# Patient Record
Sex: MALE | Race: BLACK OR AFRICAN AMERICAN | Employment: OTHER | URBAN - METROPOLITAN AREA
[De-identification: names, ages, dates, MRNs, and addresses within clinical notes are randomized per-mention and may not be internally consistent; named-entity substitution may affect disease eponyms.]

---

## 2021-09-29 LAB
ALBUMIN SERPL-MCNC: 3.6 G/DL (ref 3.4–5)
ALBUMIN/GLOB SERPL: 1.1 {RATIO}
ALP SERPL-CCNC: 76 U/L (ref 50–144)
ALT SERPL-CCNC: 32 U/L (ref 1–45)
ANION GAP SERPL CALC-SCNC: 11 MMOL/L (ref 7–16)
AST SERPL-CCNC: 24 U/L (ref 17–59)
BASOPHILS # BLD AUTO: 0.02 X10(3)/MCL (ref 0.01–0.08)
BASOPHILS NFR BLD AUTO: 0.4 % (ref 0.1–1.2)
BILIRUB SERPL-MCNC: 0.36 MG/DL (ref 0.1–1)
BUN SERPL-MCNC: 8 MG/DL (ref 7–20)
CALCIUM SERPL-MCNC: 9.2 MG/DL (ref 8.4–10.2)
CHLORIDE SERPL-SCNC: 96 MMOL/L (ref 94–110)
CHOLEST SERPL-MCNC: 191 MG/DL (ref 0–200)
CO2 SERPL-SCNC: 25 MMOL/L (ref 21–32)
CREAT SERPL-MCNC: 0.92 MG/DL (ref 0.66–1.25)
CREAT/UREA NIT SERPL: 8.7 (ref 12–20)
EOSINOPHIL # BLD AUTO: 0.1 X10(3)/MCL (ref 0.04–0.54)
EOSINOPHIL NFR BLD AUTO: 2 % (ref 0.7–7)
ERYTHROCYTE [DISTWIDTH] IN BLOOD BY AUTOMATED COUNT: 12 % (ref 11.6–14.4)
EST. AVERAGE GLUCOSE BLD GHB EST-MCNC: 231 MG/DL (ref 70–115)
GLOBULIN SER-MCNC: 3.3 G/DL (ref 2–3.9)
GLUCOSE SERPL-MCNC: 357 MGM./DL (ref 70–115)
HBA1C MFR BLD: 9.7 % (ref 4–6)
HCT VFR BLD AUTO: 43.9 % (ref 36–52)
HDLC SERPL-MCNC: 28 MG/DL (ref 40–60)
HGB BLD-MCNC: 13.9 G/DL (ref 13–18)
IMM GRANULOCYTES # BLD AUTO: 0.01 X10E3/UL (ref 0–0.03)
IMM GRANULOCYTES NFR BLD AUTO: 0.2 % (ref 0–0.5)
LDLC SERPL CALC-MCNC: 115.3 MG/DL (ref 30–100)
LYMPHOCYTES # BLD AUTO: 1.6 X10(3)/MCL (ref 1.32–3.57)
LYMPHOCYTES NFR BLD AUTO: 32 % (ref 20–55)
MCH RBC QN AUTO: 26.4 PG (ref 27–34)
MCHC RBC AUTO-ENTMCNC: 31.7 G/DL (ref 31–37)
MCV RBC AUTO: 83.5 FL (ref 79–99)
MONOCYTES # BLD AUTO: 0.42 X10(3)/MCL (ref 0.3–0.82)
MONOCYTES NFR BLD AUTO: 8.4 % (ref 4.7–12.5)
NEUTROPHILS # BLD AUTO: 2.85 X10(3)/MCL (ref 1.78–5.38)
NEUTROPHILS NFR BLD AUTO: 57 % (ref 37–73)
PLATELET # BLD AUTO: 172 X10(3)/MCL (ref 140–371)
PMV BLD AUTO: 11.7 FL (ref 9.4–12.4)
POTASSIUM SERPL-SCNC: 4.5 MMOL/L (ref 3.5–5.1)
PROT SERPL-MCNC: 6.9 G/DL (ref 6.3–8.2)
RBC # BLD AUTO: 5.26 X10(6)/MCL (ref 4–6)
SODIUM SERPL-SCNC: 132 MMOL/L (ref 135–145)
TRIGL SERPL-MCNC: 222 MG/DL (ref 30–200)
TSH SERPL-ACNC: 2.45 UIU/ML (ref 0.36–3.74)
WBC # SPEC AUTO: 5 X10(3)/MCL (ref 4–11.5)

## 2022-01-07 LAB
ALBUMIN SERPL-MCNC: 4.4 G/DL (ref 3.4–5)
ALBUMIN/GLOB SERPL: 1.6 {RATIO}
ALP SERPL-CCNC: 53 U/L (ref 50–144)
ALT SERPL-CCNC: 16 U/L (ref 1–45)
ANION GAP SERPL CALC-SCNC: 5 MMOL/L (ref 2–13)
AST SERPL-CCNC: 28 U/L (ref 17–59)
BILIRUB SERPL-MCNC: 0.75 MG/DL (ref 0–1)
BUN SERPL-MCNC: 16 MG/DL (ref 7–20)
CALCIUM SERPL-MCNC: 9.7 MG/DL (ref 8.4–10.2)
CHLORIDE SERPL-SCNC: 105 MMOL/L (ref 94–110)
CHOLEST SERPL-MCNC: 106 MG/DL (ref 0–200)
CO2 SERPL-SCNC: 29 MMOL/L (ref 21–32)
CREAT SERPL-MCNC: 1.15 MG/DL (ref 0.66–1.25)
CREAT/UREA NIT SERPL: 13.9 (ref 12–20)
EST. AVERAGE GLUCOSE BLD GHB EST-MCNC: 108 MG/DL (ref 70–115)
GLOBULIN SER-MCNC: 2.7 G/DL (ref 2–3.9)
GLUCOSE SERPL-MCNC: 72 MGM./DL (ref 70–115)
HBA1C MFR BLD: 5.6 % (ref 4–6)
HDLC SERPL-MCNC: 47 MG/DL (ref 40–60)
LDLC SERPL CALC-MCNC: 42.8 MG/DL (ref 30–100)
POTASSIUM SERPL-SCNC: 3.8 MMOL/L (ref 3.5–5.1)
PROT SERPL-MCNC: 7.1 G/DL (ref 6.3–8.2)
SODIUM SERPL-SCNC: 139 MMOL/L (ref 135–145)
TRIGL SERPL-MCNC: 98 MG/DL (ref 30–200)

## 2022-04-11 ENCOUNTER — HISTORICAL (OUTPATIENT)
Dept: ADMINISTRATIVE | Facility: HOSPITAL | Age: 62
End: 2022-04-11

## 2022-04-25 VITALS
WEIGHT: 259.5 LBS | BODY MASS INDEX: 33.3 KG/M2 | OXYGEN SATURATION: 99 % | DIASTOLIC BLOOD PRESSURE: 80 MMHG | SYSTOLIC BLOOD PRESSURE: 164 MMHG | HEIGHT: 74 IN

## 2022-05-07 ENCOUNTER — HISTORICAL (OUTPATIENT)
Dept: ADMINISTRATIVE | Facility: HOSPITAL | Age: 62
End: 2022-05-07

## 2022-11-02 ENCOUNTER — HISTORICAL (OUTPATIENT)
Dept: ADMINISTRATIVE | Facility: HOSPITAL | Age: 62
End: 2022-11-02

## 2023-01-07 ENCOUNTER — DOCUMENTATION ONLY (OUTPATIENT)
Dept: FAMILY MEDICINE | Facility: CLINIC | Age: 63
End: 2023-01-07

## 2023-01-07 VITALS
BODY MASS INDEX: 33.81 KG/M2 | WEIGHT: 263.44 LBS | HEIGHT: 74 IN | SYSTOLIC BLOOD PRESSURE: 134 MMHG | TEMPERATURE: 98 F | DIASTOLIC BLOOD PRESSURE: 72 MMHG | OXYGEN SATURATION: 99 % | HEART RATE: 81 BPM

## 2023-01-07 RX ORDER — ROSUVASTATIN CALCIUM 10 MG/1
10 TABLET, COATED ORAL
COMMUNITY
Start: 2021-12-08 | End: 2023-05-01

## 2023-01-07 RX ORDER — CARVEDILOL 6.25 MG/1
6.25 TABLET ORAL
COMMUNITY
Start: 2022-04-19 | End: 2023-02-02

## 2023-01-07 RX ORDER — DAPAGLIFLOZIN AND METFORMIN HYDROCHLORIDE 5; 1000 MG/1; MG/1
1 TABLET, FILM COATED, EXTENDED RELEASE ORAL EVERY MORNING
COMMUNITY
End: 2023-05-01

## 2023-01-07 RX ORDER — AMLODIPINE BESYLATE 10 MG/1
10 TABLET ORAL
COMMUNITY
Start: 2021-12-06 | End: 2023-02-22

## 2023-01-07 RX ORDER — PIOGLITAZONEHYDROCHLORIDE 30 MG/1
30 TABLET ORAL DAILY
COMMUNITY
End: 2023-11-09

## 2023-01-07 RX ORDER — VALSARTAN AND HYDROCHLOROTHIAZIDE 160; 25 MG/1; MG/1
TABLET ORAL
COMMUNITY
Start: 2022-04-19 | End: 2023-02-02 | Stop reason: DRUGHIGH

## 2023-01-26 PROCEDURE — 83036 HEMOGLOBIN GLYCOSYLATED A1C: CPT | Performed by: FAMILY MEDICINE

## 2023-01-26 PROCEDURE — 80053 COMPREHEN METABOLIC PANEL: CPT | Performed by: FAMILY MEDICINE

## 2023-01-26 PROCEDURE — 85025 COMPLETE CBC W/AUTO DIFF WBC: CPT | Performed by: FAMILY MEDICINE

## 2023-01-26 PROCEDURE — 82607 VITAMIN B-12: CPT | Performed by: FAMILY MEDICINE

## 2023-01-26 PROCEDURE — 80061 LIPID PANEL: CPT | Performed by: FAMILY MEDICINE

## 2023-01-26 PROCEDURE — 86803 HEPATITIS C AB TEST: CPT | Performed by: FAMILY MEDICINE

## 2023-02-02 ENCOUNTER — OFFICE VISIT (OUTPATIENT)
Dept: FAMILY MEDICINE | Facility: CLINIC | Age: 63
End: 2023-02-02
Payer: MEDICAID

## 2023-02-02 VITALS
TEMPERATURE: 97 F | WEIGHT: 268.75 LBS | HEART RATE: 82 BPM | SYSTOLIC BLOOD PRESSURE: 148 MMHG | DIASTOLIC BLOOD PRESSURE: 80 MMHG | HEIGHT: 74 IN | BODY MASS INDEX: 34.49 KG/M2 | OXYGEN SATURATION: 96 %

## 2023-02-02 DIAGNOSIS — E78.2 MIXED HYPERLIPIDEMIA: Primary | ICD-10-CM

## 2023-02-02 DIAGNOSIS — N52.1 ERECTILE DYSFUNCTION DUE TO DISEASES CLASSIFIED ELSEWHERE: ICD-10-CM

## 2023-02-02 DIAGNOSIS — E11.69 TYPE 2 DIABETES MELLITUS WITH OTHER SPECIFIED COMPLICATION, WITHOUT LONG-TERM CURRENT USE OF INSULIN: ICD-10-CM

## 2023-02-02 DIAGNOSIS — I10 PRIMARY HYPERTENSION: ICD-10-CM

## 2023-02-02 PROBLEM — E11.9 TYPE 2 DIABETES MELLITUS: Status: ACTIVE | Noted: 2023-02-02

## 2023-02-02 PROBLEM — N52.9 ERECTILE DYSFUNCTION: Status: ACTIVE | Noted: 2023-02-02

## 2023-02-02 PROBLEM — M54.16 LUMBAR RADICULOPATHY: Status: ACTIVE | Noted: 2023-02-02

## 2023-02-02 PROCEDURE — 99214 PR OFFICE/OUTPT VISIT, EST, LEVL IV, 30-39 MIN: ICD-10-PCS | Mod: ,,, | Performed by: FAMILY MEDICINE

## 2023-02-02 PROCEDURE — 3079F PR MOST RECENT DIASTOLIC BLOOD PRESSURE 80-89 MM HG: ICD-10-PCS | Mod: CPTII,,, | Performed by: FAMILY MEDICINE

## 2023-02-02 PROCEDURE — 3077F SYST BP >= 140 MM HG: CPT | Mod: CPTII,,, | Performed by: FAMILY MEDICINE

## 2023-02-02 PROCEDURE — 1160F PR REVIEW ALL MEDS BY PRESCRIBER/CLIN PHARMACIST DOCUMENTED: ICD-10-PCS | Mod: CPTII,,, | Performed by: FAMILY MEDICINE

## 2023-02-02 PROCEDURE — 1159F PR MEDICATION LIST DOCUMENTED IN MEDICAL RECORD: ICD-10-PCS | Mod: CPTII,,, | Performed by: FAMILY MEDICINE

## 2023-02-02 PROCEDURE — 3077F PR MOST RECENT SYSTOLIC BLOOD PRESSURE >= 140 MM HG: ICD-10-PCS | Mod: CPTII,,, | Performed by: FAMILY MEDICINE

## 2023-02-02 PROCEDURE — 1159F MED LIST DOCD IN RCRD: CPT | Mod: CPTII,,, | Performed by: FAMILY MEDICINE

## 2023-02-02 PROCEDURE — 99214 OFFICE O/P EST MOD 30 MIN: CPT | Mod: ,,, | Performed by: FAMILY MEDICINE

## 2023-02-02 PROCEDURE — 3008F PR BODY MASS INDEX (BMI) DOCUMENTED: ICD-10-PCS | Mod: CPTII,,, | Performed by: FAMILY MEDICINE

## 2023-02-02 PROCEDURE — 3079F DIAST BP 80-89 MM HG: CPT | Mod: CPTII,,, | Performed by: FAMILY MEDICINE

## 2023-02-02 PROCEDURE — 1160F RVW MEDS BY RX/DR IN RCRD: CPT | Mod: CPTII,,, | Performed by: FAMILY MEDICINE

## 2023-02-02 PROCEDURE — 3008F BODY MASS INDEX DOCD: CPT | Mod: CPTII,,, | Performed by: FAMILY MEDICINE

## 2023-02-02 RX ORDER — SEMAGLUTIDE 1.34 MG/ML
0.25 INJECTION, SOLUTION SUBCUTANEOUS
Qty: 1 PEN | Refills: 5 | Status: SHIPPED | OUTPATIENT
Start: 2023-02-02 | End: 2023-05-08 | Stop reason: SDUPTHER

## 2023-02-02 RX ORDER — VALSARTAN AND HYDROCHLOROTHIAZIDE 320; 25 MG/1; MG/1
1 TABLET, FILM COATED ORAL DAILY
Qty: 90 TABLET | Refills: 3 | Status: SHIPPED | OUTPATIENT
Start: 2023-02-02 | End: 2024-02-20

## 2023-02-02 RX ORDER — SILDENAFIL 50 MG/1
50 TABLET, FILM COATED ORAL DAILY PRN
Qty: 30 TABLET | Refills: 5 | Status: SHIPPED | OUTPATIENT
Start: 2023-02-02 | End: 2023-08-21

## 2023-02-02 NOTE — PATIENT INSTRUCTIONS
Increase fluids    Over-the-counter cold and cough medication     Ibuprofen and Tylenol as needed for sore throat, headache, fever    Expect resolution over the next 7-10 days    If symptoms fail to resolve after 7-10 days or they worsen, this infection may have turned into a bacterial sinusitis and you may need some additional medications.

## 2023-02-02 NOTE — ASSESSMENT & PLAN NOTE
Poorly controlled  Continue amlodipine 10 mg a day  Increase valsartan to 320 mg a day and continue HCTZ 25 mg a day

## 2023-02-02 NOTE — ASSESSMENT & PLAN NOTE
Hemoglobin A1c has improved to 8.0 but still poorly controlled.      Continue Xigduo XR 5/1000 mg b.i.d. to avoid side effects   Continue pioglitazone 30 mg a day    Add Ozempic 0.25 mg weekly and titrate as tolerated    Discussed therapeutic lifestyle changes

## 2023-02-02 NOTE — PROGRESS NOTES
"SUBJECTIVE:  HPI    Kane Gutierrez is a 62 y.o. male here for Follow-up.  Here for follow-up on chronic health conditions.  The last office note in [facility] from November 2, 2022 reviewed.      He does report that he recently had an MRI of his lumbar spine and is following up with the specialist.  He continues to have low back pain and decreased mobility.      Otherwise, he reports poor compliance with diet.  He has not really been checking his blood glucose.    He does report that he is having some erectile dysfunction.    Wests allergies, medications, history, and problem list were updated as appropriate.    ROS:  Pertinent ROS as above, otherwise negative    OBJECTIVE:  Vital signs  Visit Vitals  BP (!) 148/80 (BP Location: Left arm, Patient Position: Sitting, BP Method: Large (Manual))   Pulse 82   Temp 97.2 °F (36.2 °C) (Oral)   Ht 6' 2.21" (1.885 m)   Wt 121.9 kg (268 lb 11.9 oz)   SpO2 96%   BMI 34.31 kg/m²          PHYSICAL EXAM:  General:  Awake, alert, no acute distress, antalgic gait and posture with a slow and shuffling gait with the assistance of a cane  Cardiovascular: Regular rate and rhythm.  No murmurs.  Respiratory: Clear to auscultation bilaterally, normal effort  Extremities:  No peripheral edema, no cyanosis  Skin: No rashes    Chemistry:  Lab Results   Component Value Date     01/26/2023    K 4.7 01/26/2023    BUN 23.0 (H) 01/26/2023    CREATININE 1.38 (H) 01/26/2023    EGFRNORACEVR 58 01/26/2023    GLUCOSE 194 (H) 01/26/2023    CALCIUM 9.7 01/26/2023    ALKPHOS 59 01/26/2023    AST 21 01/26/2023    ALT 20 01/26/2023    TSH 2.45 09/29/2021        Lab Results   Component Value Date    HGBA1C 8.0 (H) 01/26/2023        Hematology:  Lab Results   Component Value Date    WBC 5.8 01/26/2023    HGB 15.0 01/26/2023    MCV 85.8 01/26/2023    PLT 97 (L) 01/26/2023       Lipid Panel:  Lab Results   Component Value Date    CHOL 134 01/26/2023    HDL 40 01/26/2023    DLDL 65.2 01/26/2023    TRIG " 136 01/26/2023        ASSESSMENT/PLAN:  1. Mixed hyperlipidemia  Assessment & Plan:  Less than 70   Continue rosuvastatin 10 mg a day    Orders:  -     Lipid Panel; Future; Expected date: 05/02/2023    2. Erectile dysfunction due to diseases classified elsewhere  Assessment & Plan:  Trial of sildenafil  mg as needed    Orders:  -     sildenafiL (VIAGRA) 50 MG tablet; Take 1 tablet (50 mg total) by mouth daily as needed for Erectile Dysfunction.  Dispense: 30 tablet; Refill: 5    3. Type 2 diabetes mellitus with other specified complication, without long-term current use of insulin  Overview:  Diagnosed 1990s    Assessment & Plan:  Hemoglobin A1c has improved to 8.0 but still poorly controlled.      Continue Xigduo XR 5/1000 mg b.i.d. to avoid side effects   Continue pioglitazone 30 mg a day    Add Ozempic 0.25 mg weekly and titrate as tolerated    Discussed therapeutic lifestyle changes      Orders:  -     Hemoglobin A1C; Future; Expected date: 05/02/2023  -     Vitamin B12; Future; Expected date: 05/02/2023  -     semaglutide (OZEMPIC) 0.25 mg or 0.5 mg(2 mg/1.5 mL) pen injector; Inject 0.25 mg into the skin every 7 days.  Dispense: 1 pen; Refill: 5    4. Primary hypertension  Assessment & Plan:  Poorly controlled  Continue amlodipine 10 mg a day  Increase valsartan to 320 mg a day and continue HCTZ 25 mg a day    Orders:  -     Comprehensive Metabolic Panel; Future; Expected date: 05/02/2023  -     valsartan-hydrochlorothiazide (DIOVAN-HCT) 320-25 mg per tablet; Take 1 tablet by mouth once daily.  Dispense: 90 tablet; Refill: 3            Follow Up:  Follow up in about 3 months (around 5/2/2023) for Fasting labs, Chronic medical follow-up.  Follow-up labs to include a hemoglobin A1c, B12 level, CMP, lipid panel

## 2023-05-01 DIAGNOSIS — E78.2 MIXED HYPERLIPIDEMIA: ICD-10-CM

## 2023-05-01 DIAGNOSIS — E11.69 TYPE 2 DIABETES MELLITUS WITH OTHER SPECIFIED COMPLICATION: ICD-10-CM

## 2023-05-01 PROCEDURE — 83036 HEMOGLOBIN GLYCOSYLATED A1C: CPT | Performed by: FAMILY MEDICINE

## 2023-05-01 PROCEDURE — 80061 LIPID PANEL: CPT | Performed by: FAMILY MEDICINE

## 2023-05-01 PROCEDURE — 82607 VITAMIN B-12: CPT | Performed by: FAMILY MEDICINE

## 2023-05-01 PROCEDURE — 80053 COMPREHEN METABOLIC PANEL: CPT | Performed by: FAMILY MEDICINE

## 2023-05-01 RX ORDER — DAPAGLIFLOZIN AND METFORMIN HYDROCHLORIDE 5; 1000 MG/1; MG/1
TABLET, FILM COATED, EXTENDED RELEASE ORAL
Qty: 60 TABLET | Refills: 11 | Status: SHIPPED | OUTPATIENT
Start: 2023-05-01

## 2023-05-01 RX ORDER — ROSUVASTATIN CALCIUM 10 MG/1
TABLET, COATED ORAL
Qty: 30 TABLET | Refills: 11 | Status: SHIPPED | OUTPATIENT
Start: 2023-05-01

## 2023-05-08 ENCOUNTER — OFFICE VISIT (OUTPATIENT)
Dept: FAMILY MEDICINE | Facility: CLINIC | Age: 63
End: 2023-05-08
Payer: MEDICAID

## 2023-05-08 VITALS
BODY MASS INDEX: 32.55 KG/M2 | WEIGHT: 253.63 LBS | OXYGEN SATURATION: 98 % | HEIGHT: 74 IN | TEMPERATURE: 99 F | HEART RATE: 86 BPM | SYSTOLIC BLOOD PRESSURE: 138 MMHG | DIASTOLIC BLOOD PRESSURE: 78 MMHG

## 2023-05-08 DIAGNOSIS — I10 PRIMARY HYPERTENSION: ICD-10-CM

## 2023-05-08 DIAGNOSIS — N18.31 TYPE 2 DIABETES MELLITUS WITH STAGE 3A CHRONIC KIDNEY DISEASE, WITHOUT LONG-TERM CURRENT USE OF INSULIN: Primary | ICD-10-CM

## 2023-05-08 DIAGNOSIS — E78.2 MIXED HYPERLIPIDEMIA: ICD-10-CM

## 2023-05-08 DIAGNOSIS — E11.69 TYPE 2 DIABETES MELLITUS WITH OTHER SPECIFIED COMPLICATION, WITHOUT LONG-TERM CURRENT USE OF INSULIN: ICD-10-CM

## 2023-05-08 DIAGNOSIS — N52.1 ERECTILE DYSFUNCTION DUE TO DISEASES CLASSIFIED ELSEWHERE: ICD-10-CM

## 2023-05-08 DIAGNOSIS — E11.22 TYPE 2 DIABETES MELLITUS WITH STAGE 3A CHRONIC KIDNEY DISEASE, WITHOUT LONG-TERM CURRENT USE OF INSULIN: Primary | ICD-10-CM

## 2023-05-08 DIAGNOSIS — N18.31 STAGE 3A CHRONIC KIDNEY DISEASE: ICD-10-CM

## 2023-05-08 PROBLEM — E11.9 TYPE 2 DIABETES MELLITUS: Chronic | Status: ACTIVE | Noted: 2023-02-02

## 2023-05-08 PROCEDURE — 3075F PR MOST RECENT SYSTOLIC BLOOD PRESS GE 130-139MM HG: ICD-10-PCS | Mod: CPTII,,, | Performed by: FAMILY MEDICINE

## 2023-05-08 PROCEDURE — 3078F PR MOST RECENT DIASTOLIC BLOOD PRESSURE < 80 MM HG: ICD-10-PCS | Mod: CPTII,,, | Performed by: FAMILY MEDICINE

## 2023-05-08 PROCEDURE — 1160F RVW MEDS BY RX/DR IN RCRD: CPT | Mod: CPTII,,, | Performed by: FAMILY MEDICINE

## 2023-05-08 PROCEDURE — 3008F BODY MASS INDEX DOCD: CPT | Mod: CPTII,,, | Performed by: FAMILY MEDICINE

## 2023-05-08 PROCEDURE — 3075F SYST BP GE 130 - 139MM HG: CPT | Mod: CPTII,,, | Performed by: FAMILY MEDICINE

## 2023-05-08 PROCEDURE — 3008F PR BODY MASS INDEX (BMI) DOCUMENTED: ICD-10-PCS | Mod: CPTII,,, | Performed by: FAMILY MEDICINE

## 2023-05-08 PROCEDURE — 99214 OFFICE O/P EST MOD 30 MIN: CPT | Mod: ,,, | Performed by: FAMILY MEDICINE

## 2023-05-08 PROCEDURE — 1159F MED LIST DOCD IN RCRD: CPT | Mod: CPTII,,, | Performed by: FAMILY MEDICINE

## 2023-05-08 PROCEDURE — 1160F PR REVIEW ALL MEDS BY PRESCRIBER/CLIN PHARMACIST DOCUMENTED: ICD-10-PCS | Mod: CPTII,,, | Performed by: FAMILY MEDICINE

## 2023-05-08 PROCEDURE — 1159F PR MEDICATION LIST DOCUMENTED IN MEDICAL RECORD: ICD-10-PCS | Mod: CPTII,,, | Performed by: FAMILY MEDICINE

## 2023-05-08 PROCEDURE — 99214 PR OFFICE/OUTPT VISIT, EST, LEVL IV, 30-39 MIN: ICD-10-PCS | Mod: ,,, | Performed by: FAMILY MEDICINE

## 2023-05-08 PROCEDURE — 3078F DIAST BP <80 MM HG: CPT | Mod: CPTII,,, | Performed by: FAMILY MEDICINE

## 2023-05-08 RX ORDER — SEMAGLUTIDE 1.34 MG/ML
0.5 INJECTION, SOLUTION SUBCUTANEOUS
Qty: 1 EACH | Refills: 11 | Status: SHIPPED | OUTPATIENT
Start: 2023-05-08

## 2023-05-08 NOTE — PROGRESS NOTES
"SUBJECTIVE:  HPI    Kane Gutierrez is a 62 y.o. male here for Follow-up (LAB RESULTS).  Since his last visit 3 months ago, his blood pressure has improved.  Additionally, his weight is down 15 lb.  He states that he feels much better, especially over the last month and a half.  He denies any medication side effects except for some appetite suppression with Ozempic.      He continues regular follow-up with the back specialist but he does continue to have weakness in the left leg.    He denies any dizziness, lightheadedness, chest pain, shortness some breath.      Wests allergies, medications, history, and problem list were updated as appropriate.    ROS:  Pertinent ROS as above, otherwise negative    OBJECTIVE:  Vital signs  Visit Vitals  /78 (BP Location: Right arm, Patient Position: Sitting, BP Method: Medium (Manual))   Pulse 86   Temp 98.7 °F (37.1 °C) (Oral)   Ht 6' 2.21" (1.885 m)   Wt 115 kg (253 lb 9.6 oz)   SpO2 98%   BMI 32.37 kg/m²          PHYSICAL EXAM:  General:  Awake, alert, no acute distress, antalgic gait and posture, walks with a cane  Eyes:  Pupils equal, round, reactive to light.  Conjunctiva normal bilaterally.  Neck:  No lymphadenopathy, no bruit  Cardiovascular: Regular rate and rhythm.  No murmurs.  Respiratory: Clear to auscultation bilaterally, normal effort  Extremities:  No peripheral edema, no cyanosis  Skin: No rashes    Chemistry:  Lab Results   Component Value Date     05/01/2023    K 4.4 05/01/2023    BUN 18.0 05/01/2023    CREATININE 1.39 (H) 05/01/2023    EGFRNORACEVR 58 05/01/2023    GLUCOSE 117 (H) 05/01/2023    CALCIUM 9.4 05/01/2023    ALKPHOS 55 05/01/2023    AST 20 05/01/2023    ALT 15 05/01/2023    TSH 2.45 09/29/2021        Lab Results   Component Value Date    HGBA1C 6.9 (H) 05/01/2023        Hematology:  Lab Results   Component Value Date    WBC 5.8 01/26/2023    HGB 15.0 01/26/2023    MCV 85.8 01/26/2023    PLT 97 (L) 01/26/2023       Lipid Panel:  Lab " Results   Component Value Date    CHOL 101 05/01/2023    HDL 37 (L) 05/01/2023    DLDL 41.3 05/01/2023    TRIG 119 05/01/2023        ASSESSMENT/PLAN:  1. Type 2 diabetes mellitus with stage 3a chronic kidney disease, without long-term current use of insulin  Overview:  Diagnosed 1990s  Lab Results Component Value Date  HGBA1C 6.9 (H) 05/01/2023      Assessment & Plan:  Pioglitazone 30 mg daily, Xigduo XR 5/1000 mg, 2 tablets daily    Increase Ozempic 0.5 mg weekly    Orders:  -     Comprehensive Metabolic Panel; Future; Expected date: 08/08/2023  -     Hemoglobin A1C; Future; Expected date: 08/08/2023  -     semaglutide (OZEMPIC) 0.25 mg or 0.5 mg(2 mg/1.5 mL) pen injector; Inject 0.5 mg into the skin every 7 days.  Dispense: 1 each; Refill: 11    2. Mixed hyperlipidemia  Overview:  Lab Results   Component Value Date    CHOL 101 05/01/2023    CHOL 134 01/26/2023    CHOL 106 01/07/2022     Lab Results   Component Value Date    HDL 37 (L) 05/01/2023    HDL 40 01/26/2023    HDL 47 01/07/2022     No results found for: LDLCALC  Lab Results   Component Value Date    DLDL 41.3 05/01/2023    DLDL 65.2 01/26/2023     Lab Results   Component Value Date    TRIG 119 05/01/2023    TRIG 136 01/26/2023    TRIG 98 01/07/2022       f1 No results found for: CHOLHDL      Assessment & Plan:  At goal on rosuvastatin 10 mg daily    Orders:  -     Lipid Panel; Future; Expected date: 08/08/2023    3. Primary hypertension  Assessment & Plan:  Amlodipine 10 mg daily, valsartan hydrochlorothiazide 320/25 mg daily    Orders:  -     Vitamin B12; Future; Expected date: 08/08/2023    4. Erectile dysfunction due to diseases classified elsewhere  Assessment & Plan:  Successful treatment with sildenafil 50 mg as needed    Orders:  -     Vitamin B12; Future; Expected date: 08/08/2023    5. Stage 3a chronic kidney disease  Overview:  Lab Results   Component Value Date    CREATININE 1.39 (H) 05/01/2023    EGFRNONAA 69 01/07/2022    EGFRNONAA 89  09/29/2021         Assessment & Plan:  Blood pressure and diabetes control     Avoid NSAIDs      6. Type 2 diabetes mellitus with other specified complication, without long-term current use of insulin  Overview:  Diagnosed 1990s  Lab Results Component Value Date  HGBA1C 6.9 (H) 05/01/2023      Assessment & Plan:  Pioglitazone 30 mg daily, Xigduo XR 5/1000 mg, 2 tablets daily    Increase Ozempic 0.5 mg weekly        Follow Up:  Follow up in about 3 months (around 8/8/2023) for Fasting labs, Follow-up.

## 2023-08-15 PROCEDURE — 80053 COMPREHEN METABOLIC PANEL: CPT | Performed by: FAMILY MEDICINE

## 2023-08-15 PROCEDURE — 83036 HEMOGLOBIN GLYCOSYLATED A1C: CPT | Performed by: FAMILY MEDICINE

## 2023-08-15 PROCEDURE — 82607 VITAMIN B-12: CPT | Performed by: FAMILY MEDICINE

## 2023-08-15 PROCEDURE — 80061 LIPID PANEL: CPT | Performed by: FAMILY MEDICINE

## 2023-08-21 ENCOUNTER — TELEPHONE (OUTPATIENT)
Dept: FAMILY MEDICINE | Facility: CLINIC | Age: 63
End: 2023-08-21

## 2023-08-21 ENCOUNTER — OFFICE VISIT (OUTPATIENT)
Dept: FAMILY MEDICINE | Facility: CLINIC | Age: 63
End: 2023-08-21
Payer: MEDICAID

## 2023-08-21 VITALS
HEART RATE: 84 BPM | BODY MASS INDEX: 31.42 KG/M2 | HEIGHT: 74 IN | TEMPERATURE: 97 F | SYSTOLIC BLOOD PRESSURE: 130 MMHG | OXYGEN SATURATION: 97 % | WEIGHT: 244.81 LBS | DIASTOLIC BLOOD PRESSURE: 68 MMHG

## 2023-08-21 DIAGNOSIS — Z12.11 SCREENING FOR COLON CANCER: ICD-10-CM

## 2023-08-21 DIAGNOSIS — E11.22 TYPE 2 DIABETES MELLITUS WITH STAGE 3A CHRONIC KIDNEY DISEASE, WITHOUT LONG-TERM CURRENT USE OF INSULIN: Primary | ICD-10-CM

## 2023-08-21 DIAGNOSIS — N18.31 TYPE 2 DIABETES MELLITUS WITH STAGE 3A CHRONIC KIDNEY DISEASE, WITHOUT LONG-TERM CURRENT USE OF INSULIN: Primary | ICD-10-CM

## 2023-08-21 DIAGNOSIS — N52.1 ERECTILE DYSFUNCTION DUE TO DISEASES CLASSIFIED ELSEWHERE: ICD-10-CM

## 2023-08-21 DIAGNOSIS — E78.2 MIXED HYPERLIPIDEMIA: Chronic | ICD-10-CM

## 2023-08-21 DIAGNOSIS — N18.31 STAGE 3A CHRONIC KIDNEY DISEASE: ICD-10-CM

## 2023-08-21 DIAGNOSIS — M54.16 LUMBAR RADICULOPATHY: ICD-10-CM

## 2023-08-21 DIAGNOSIS — I10 PRIMARY HYPERTENSION: Chronic | ICD-10-CM

## 2023-08-21 DIAGNOSIS — Z12.5 PROSTATE CANCER SCREENING INFORMATION GIVEN DURING PATIENT ENCOUNTER: ICD-10-CM

## 2023-08-21 DIAGNOSIS — D69.6 THROMBOCYTOPENIA: ICD-10-CM

## 2023-08-21 LAB
ANION GAP SERPL CALC-SCNC: 10 MEQ/L (ref 2–13)
BUN SERPL-MCNC: 18 MG/DL (ref 7–20)
CALCIUM SERPL-MCNC: 9.6 MG/DL (ref 8.4–10.2)
CHLORIDE SERPL-SCNC: 100 MMOL/L (ref 98–110)
CO2 SERPL-SCNC: 30 MMOL/L (ref 21–32)
CREAT SERPL-MCNC: 1.4 MG/DL (ref 0.66–1.25)
CREAT/UREA NIT SERPL: 13 (ref 12–20)
GFR SERPLBLD CREATININE-BSD FMLA CKD-EPI: 57 MLS/MIN/1.73/M2
GLUCOSE SERPL-MCNC: 140 MG/DL (ref 70–115)
POTASSIUM SERPL-SCNC: 4.2 MMOL/L (ref 3.5–5.1)
SODIUM SERPL-SCNC: 140 MMOL/L (ref 135–145)

## 2023-08-21 PROCEDURE — 1160F PR REVIEW ALL MEDS BY PRESCRIBER/CLIN PHARMACIST DOCUMENTED: ICD-10-PCS | Mod: CPTII,,, | Performed by: FAMILY MEDICINE

## 2023-08-21 PROCEDURE — 3044F PR MOST RECENT HEMOGLOBIN A1C LEVEL <7.0%: ICD-10-PCS | Mod: CPTII,,, | Performed by: FAMILY MEDICINE

## 2023-08-21 PROCEDURE — 3044F HG A1C LEVEL LT 7.0%: CPT | Mod: CPTII,,, | Performed by: FAMILY MEDICINE

## 2023-08-21 PROCEDURE — 1159F PR MEDICATION LIST DOCUMENTED IN MEDICAL RECORD: ICD-10-PCS | Mod: CPTII,,, | Performed by: FAMILY MEDICINE

## 2023-08-21 PROCEDURE — 99214 OFFICE O/P EST MOD 30 MIN: CPT | Mod: ,,, | Performed by: FAMILY MEDICINE

## 2023-08-21 PROCEDURE — 3075F PR MOST RECENT SYSTOLIC BLOOD PRESS GE 130-139MM HG: ICD-10-PCS | Mod: CPTII,,, | Performed by: FAMILY MEDICINE

## 2023-08-21 PROCEDURE — 80048 BASIC METABOLIC PNL TOTAL CA: CPT | Performed by: FAMILY MEDICINE

## 2023-08-21 PROCEDURE — 3008F BODY MASS INDEX DOCD: CPT | Mod: CPTII,,, | Performed by: FAMILY MEDICINE

## 2023-08-21 PROCEDURE — 1159F MED LIST DOCD IN RCRD: CPT | Mod: CPTII,,, | Performed by: FAMILY MEDICINE

## 2023-08-21 PROCEDURE — 3078F DIAST BP <80 MM HG: CPT | Mod: CPTII,,, | Performed by: FAMILY MEDICINE

## 2023-08-21 PROCEDURE — 3075F SYST BP GE 130 - 139MM HG: CPT | Mod: CPTII,,, | Performed by: FAMILY MEDICINE

## 2023-08-21 PROCEDURE — 1160F RVW MEDS BY RX/DR IN RCRD: CPT | Mod: CPTII,,, | Performed by: FAMILY MEDICINE

## 2023-08-21 PROCEDURE — 99214 PR OFFICE/OUTPT VISIT, EST, LEVL IV, 30-39 MIN: ICD-10-PCS | Mod: ,,, | Performed by: FAMILY MEDICINE

## 2023-08-21 PROCEDURE — 3078F PR MOST RECENT DIASTOLIC BLOOD PRESSURE < 80 MM HG: ICD-10-PCS | Mod: CPTII,,, | Performed by: FAMILY MEDICINE

## 2023-08-21 PROCEDURE — 3008F PR BODY MASS INDEX (BMI) DOCUMENTED: ICD-10-PCS | Mod: CPTII,,, | Performed by: FAMILY MEDICINE

## 2023-08-21 NOTE — PROGRESS NOTES
"SUBJECTIVE:  HPI    Kane Gutierrez is a 63 y.o. male here for Follow-up (Lab results) on chronic health conditions.    He reports fever, chills, malaise, nasal congestion and cough that lasted for several days approximately 10-14 days ago.  The symptoms resolved.  He reports decreased urine output last week but that has improved in the last 3-4 days.  He now has normal urine output and normal urine color.      He denies any chest pain or shortness a breath.      He recently underwent a lumbar epidural steroid injection last week for his ongoing lumbar radiculitis and radiculopathy.    Wests allergies, medications, history, and problem list were updated as appropriate.    ROS:  Pertinent ROS as above, otherwise negative    OBJECTIVE:  Vital signs  Visit Vitals  /68 (BP Location: Right arm, Patient Position: Sitting, BP Method: Medium (Manual))   Pulse 84   Temp 97.4 °F (36.3 °C) (Temporal)   Ht 6' 2.21" (1.885 m)   Wt 111 kg (244 lb 12.8 oz)   SpO2 97%   BMI 31.25 kg/m²          PHYSICAL EXAM:  General:  Awake, alert, no acute distress, ambulating with a cane   Eyes:  Pupils equal, round, reactive to light.  Conjunctiva normal bilaterally.  Cardiovascular: Regular rate and rhythm.  No murmurs.  Respiratory: Clear to auscultation bilaterally, normal effort  Extremities:  No peripheral edema, no cyanosis  Skin: No rashes    Chemistry:  Lab Results   Component Value Date     08/15/2023    K 3.9 08/15/2023    BUN 23.0 (H) 08/15/2023    CREATININE 2.27 (H) 08/15/2023    EGFRNORACEVR 32 08/15/2023    GLUCOSE 143 (H) 08/15/2023    CALCIUM 9.5 08/15/2023    ALKPHOS 69 08/15/2023    AST 19 08/15/2023    ALT 18 08/15/2023    TSH 2.45 09/29/2021        Lab Results   Component Value Date    HGBA1C 6.2 (H) 08/15/2023        Hematology:  Lab Results   Component Value Date    WBC 5.8 01/26/2023    HGB 15.0 01/26/2023    MCV 85.8 01/26/2023    PLT 97 (L) 01/26/2023       Lipid Panel:  Lab Results   Component Value " "Date    CHOL 90 08/15/2023    HDL 25 (L) 08/15/2023    DLDL 43.9 08/15/2023    TRIG 133 08/15/2023        ASSESSMENT/PLAN:  1. Type 2 diabetes mellitus with stage 3a chronic kidney disease, without long-term current use of insulin  Overview:  Lab Results   Component Value Date    HGBA1C 6.2 (H) 08/15/2023     Diagnosed 1990s    Assessment & Plan:  Hemoglobin A1c at goal on Ozempic 0.5 mg weekly, pioglitazone 30 mg daily;  Xigduo XR 5/1000 mg, 2 tablets daily    Refer for diabetic eye exam    Orders:  -     Ambulatory referral/consult to Ophthalmology; Future; Expected date: 08/28/2023  -     Hemoglobin A1C; Future; Expected date: 11/21/2023    2. Stage 3a chronic kidney disease  Overview:  Lab Results   Component Value Date    CREATININE 2.27 (H) 08/15/2023    EGFRNONAA 69 01/07/2022    EGFRNONAA 89 09/29/2021         Assessment & Plan:  GFR has decreased from 58-32 since last visit.  I suspect that this is related to recent illness.  He describes symptoms that sound like COVID-19 infection proximally 10-14 days ago.    Check basic metabolic panel today    Consider stopping metformin    Orders:  -     Basic Metabolic Panel; Future; Expected date: 08/21/2023  -     Comprehensive Metabolic Panel; Future; Expected date: 11/21/2023    3. Primary hypertension  Assessment & Plan:  Controlled.  Continue amlodipine 10 mg daily, valsartan hydrochlorothiazide 320/25 mg daily      4. Screening for colon cancer  -     Ambulatory referral/consult to [facility] COLONOSCOPY; Future; Expected date: 08/28/2023    5. Mixed hyperlipidemia  Overview:  Lab Results   Component Value Date    CHOL 90 08/15/2023    CHOL 101 05/01/2023    CHOL 134 01/26/2023     Lab Results   Component Value Date    HDL 25 (L) 08/15/2023    HDL 37 (L) 05/01/2023    HDL 40 01/26/2023     No results found for: "LDLCALC"  Lab Results   Component Value Date    DLDL 43.9 08/15/2023    DLDL 41.3 05/01/2023    DLDL 65.2 01/26/2023     Lab Results   Component Value " "Date    TRIG 133 08/15/2023    TRIG 119 05/01/2023    TRIG 136 01/26/2023       f1 No results found for: "CHOLHDL"      Assessment & Plan:  LDL cholesterol at goal of less than 70 on rosuvastatin 10 mg daily    Orders:  -     Lipid Panel; Future; Expected date: 11/21/2023    6. Thrombocytopenia  Overview:  Lab Results   Component Value Date    WBC 5.8 01/26/2023    HGB 15.0 01/26/2023    HCT 47.2 01/26/2023    MCV 85.8 01/26/2023    PLT 97 (L) 01/26/2023             Assessment & Plan:  Follow-up CBC on return to clinic in 3 months    Orders:  -     CBC Auto Differential; Future; Expected date: 11/21/2023    7. Prostate cancer screening information given during patient encounter  -     Prostate Specific Antigen, Diagnostic; Future; Expected date: 11/21/2023    8. Erectile dysfunction due to diseases classified elsewhere  Assessment & Plan:  Sildenafil caused side effects and was ineffective    Orders:  -     Prostate Specific Antigen, Diagnostic; Future; Expected date: 11/21/2023    9. Lumbar radiculopathy  Assessment & Plan:  He received a 2nd injection approximately one-week ago.    Continue follow-up with specialist        Follow Up:  Follow up in about 3 months (around 11/21/2023) for Follow-up, Fasting labs.          "

## 2023-08-21 NOTE — ASSESSMENT & PLAN NOTE
Hemoglobin A1c at goal on Ozempic 0.5 mg weekly, pioglitazone 30 mg daily;  Xigduo XR 5/1000 mg, 2 tablets daily    Refer for diabetic eye exam

## 2023-08-21 NOTE — ASSESSMENT & PLAN NOTE
GFR has decreased from 58-32 since last visit.  I suspect that this is related to recent illness.  He describes symptoms that sound like COVID-19 infection proximally 10-14 days ago.    Check basic metabolic panel today    Consider stopping metformin

## 2023-08-21 NOTE — TELEPHONE ENCOUNTER
----- Message from Haresh Meyer MD sent at 8/21/2023 11:58 AM CDT -----  His blood work today shows that his kidney function is stable and at baseline.  No need to change any medications.

## 2023-08-28 ENCOUNTER — DOCUMENTATION ONLY (OUTPATIENT)
Dept: ADMINISTRATIVE | Facility: HOSPITAL | Age: 63
End: 2023-08-28
Payer: MEDICAID

## 2023-08-28 LAB
LEFT EYE DM RETINOPATHY: NEGATIVE
RIGHT EYE DM RETINOPATHY: NEGATIVE

## 2023-08-28 NOTE — PROGRESS NOTES
Population Health Chart Review & Patient Outreach Details:     Reason for Outreach Encounter:     [x]  Non-Compliant Report   []  Payor Report ([employer], PHN, BCBS, MSSP, MCIP, UHC, etc.)   []  Pre-Visit Chart Review     Updates Requested / Reviewed:     []  Care Everywhere    []     []  External Sources ([facility], [facility], DIS, etc.)   [x]  Care Team Updated    Patient Outreach Method:    []  Telephone Outreach Completed   [] Successful   [] Left Voicemail   [] Unable to Contact (wrong number, no voicemail)  []  [employer] Outreach Sent  []  Letter Outreach Mailed  []  Fax Sent for External Records  [x]  External Records Upload    Health Maintenance Topics Addressed and Outreach Outcomes / Actions Taken:        []      Breast Cancer Screening []  Mammo Scheduled      []  External Records Requested     []  Added Reminder to Complete to Upcoming Primary Care Appt Notes     []  Patient Declined     []  Patient Will Call Back to Schedule     []  Patient Will Schedule with External Provider / Order Routed if Applicable             []       Cervical Cancer Screening []  Pap Scheduled      []  External Records Requested     []  Added Reminder to Complete to Upcoming Primary Care Appt Notes     []  Patient Declined     []  Patient Will Call Back to Schedule     []  Patient Will Schedule with External Provider               []          Colorectal Cancer Screening []  Colonoscopy Case Request or Referral Placed     []  External Records Requested     []  Added Reminder to Complete to Upcoming Primary Care Appt Notes     []  Patient Declined     []  Patient Will Call Back to Schedule     []  Patient Will Schedule with External Provider     []  Fit Kit Mailed (add the SmartPhrase under additional notes)     []  Reminded Patient to Complete Home Test             [x]      Diabetic Eye Exam []  Eye Camera Scheduled or Optometry Referral Placed     [x]  External Records Hyperlinked     []  Added Reminder to Complete  to Upcoming Primary Care Appt Notes     []  Patient Declined     []  Patient Will Call Back to Schedule     []  Patient Will Schedule with External Provider             []      Blood Pressure Control []  Primary Care Follow Up Visit Scheduled     []  Remote Blood Pressure Reading Captured     []  Added Reminder to Complete to Upcoming Primary Care Appt Notes     []  Patient Declined     []  Patient Will Call Back / Patient Will Send Portal Message with Reading     []  Patient Will Call Back to Schedule Provider Visit             []       HbA1c & Other Labs []  Lab Appt Scheduled for Due Labs     []  Primary Care Follow Up Visit Scheduled      []  Reminded Patient to Complete Home Test     []  Added Reminder to Complete to Upcoming Primary Care Appt Notes     []  Patient Declined     []  Patient Will Call Back to Schedule     []  Patient Will Schedule with External Provider / Order Routed if Applicable           []    Schedule Primary Care Appt []  Primary Care Appt Scheduled     []  Patient Declined     []  Patient Will Call Back to Schedule     []  Pt Established with External Provider & Updated Care Team             []      Medication Adherence []  Primary Care Appointment Scheduled     []  Added Reminder to Upcoming Primary Care Appt Notes     []  Patient Reminded to  Prescription     []  Patient Declined, Provider Notified if Needed     []  Sent Provider Message to Review and/or Add Exclusion to Problem List             []      Osteoporosis Screening []  DXA Appointment Scheduled     []  External Records Requested     []  Added Reminder to Complete to Upcoming Primary Care Appt Notes     []  Patient Declined     []  Patient Will Call Back to Schedule     []  Patient Will Schedule with External Provider / Order Routed if Applicable     Additional Care Coordinator Notes:     Hyperlinked diabetic eye exam  Next due 8/28/24  Added [facility] to Care Team

## 2023-08-31 ENCOUNTER — HOSPITAL ENCOUNTER (OUTPATIENT)
Dept: PREADMISSION TESTING | Facility: HOSPITAL | Age: 63
Discharge: HOME | End: 2023-08-31
Payer: MEDICAID

## 2023-08-31 VITALS — HEIGHT: 74 IN | WEIGHT: 244 LBS | BODY MASS INDEX: 31.32 KG/M2

## 2023-08-31 DIAGNOSIS — Z12.11 COLON CANCER SCREENING: Primary | ICD-10-CM

## 2023-09-07 ENCOUNTER — ANESTHESIA EVENT (OUTPATIENT)
Dept: GASTROENTEROLOGY | Facility: HOSPITAL | Age: 63
End: 2023-09-07
Payer: MEDICAID

## 2023-09-07 NOTE — ANESTHESIA PREPROCEDURE EVALUATION
09/07/2023  Kane Gutierrez is a 63 y.o., male.      Pre-op Assessment    I have reviewed the Patient Summary Reports.     I have reviewed the Nursing Notes. I have reviewed the NPO Status.   I have reviewed the Medications.     Review of Systems  Anesthesia Hx:  No problems with previous Anesthesia  Denies Family Hx of Anesthesia complications.   Denies Personal Hx of Anesthesia complications.   Hematology/Oncology:  Hematology Normal   Oncology Normal     EENT/Dental:EENT/Dental Normal   Cardiovascular:   Exercise tolerance: good Hypertension, well controlled  Hypertension, Essential Hypertension    Pulmonary:  Pulmonary Normal    Renal/:   Chronic Renal Disease, CKD  Kidney Function/Disease, Chronic Kidney Disease (CKD)    Hepatic/GI:  Hepatic/GI Normal    Musculoskeletal:  Musculoskeletal Normal    Neurological:   Neuromuscular Disease,  Neuromuscular Disease   Endocrine:   Diabetes, well controlled  Diabetes, Type 2 Diabetes    Dermatological:  Skin Normal    Psych:  Psychiatric Normal           Physical Exam  General: Well nourished, Cooperative, Alert and Oriented    Airway:  Mallampati: II / II  Mouth Opening: Normal  TM Distance: Normal  Tongue: Normal  Neck ROM: Normal ROM    Dental:  Intact        Anesthesia Plan  Type of Anesthesia, risks & benefits discussed:    Anesthesia Type: MAC  Intra-op Monitoring Plan: Standard ASA Monitors  Post Op Pain Control Plan: multimodal analgesia  Induction:  IV  Airway Plan: Direct  Informed Consent: Informed consent signed with the Patient and all parties understand the risks and agree with anesthesia plan.  All questions answered. Patient consented to blood products? Yes  ASA Score: 3  Day of Surgery Review of History & Physical: H&P Update referred to the surgeon/provider.I have interviewed and examined the patient. I have reviewed the patient's H&P dated:  There are no significant changes.     Ready For Surgery From Anesthesia Perspective.     .

## 2023-09-08 ENCOUNTER — HOSPITAL ENCOUNTER (OUTPATIENT)
Dept: GASTROENTEROLOGY | Facility: HOSPITAL | Age: 63
Discharge: HOME | End: 2023-09-08
Attending: FAMILY MEDICINE
Payer: MEDICAID

## 2023-09-08 ENCOUNTER — ANESTHESIA (OUTPATIENT)
Dept: GASTROENTEROLOGY | Facility: HOSPITAL | Age: 63
End: 2023-09-08
Payer: MEDICAID

## 2023-09-08 VITALS
TEMPERATURE: 98 F | RESPIRATION RATE: 18 BRPM | SYSTOLIC BLOOD PRESSURE: 149 MMHG | DIASTOLIC BLOOD PRESSURE: 83 MMHG | OXYGEN SATURATION: 99 % | HEART RATE: 66 BPM

## 2023-09-08 DIAGNOSIS — Z12.11 COLON CANCER SCREENING: ICD-10-CM

## 2023-09-08 LAB — POCT GLUCOSE: 118 MG/DL (ref 70–110)

## 2023-09-08 PROCEDURE — 82962 GLUCOSE BLOOD TEST: CPT

## 2023-09-08 PROCEDURE — 27201423 OPTIME MED/SURG SUP & DEVICES STERILE SUPPLY

## 2023-09-08 PROCEDURE — 37000008 HC ANESTHESIA 1ST 15 MINUTES

## 2023-09-08 PROCEDURE — D9220A PRA ANESTHESIA: Mod: ,,, | Performed by: NURSE ANESTHETIST, CERTIFIED REGISTERED

## 2023-09-08 PROCEDURE — 45380 COLONOSCOPY AND BIOPSY: CPT | Performed by: FAMILY MEDICINE

## 2023-09-08 PROCEDURE — D9220A PRA ANESTHESIA: ICD-10-PCS | Mod: ,,, | Performed by: NURSE ANESTHETIST, CERTIFIED REGISTERED

## 2023-09-08 PROCEDURE — 63600175 PHARM REV CODE 636 W HCPCS: Performed by: NURSE ANESTHETIST, CERTIFIED REGISTERED

## 2023-09-08 PROCEDURE — S5010 5% DEXTROSE AND 0.45% SALINE: HCPCS | Performed by: FAMILY MEDICINE

## 2023-09-08 PROCEDURE — 25000003 PHARM REV CODE 250: Performed by: NURSE ANESTHETIST, CERTIFIED REGISTERED

## 2023-09-08 PROCEDURE — 25000003 PHARM REV CODE 250: Performed by: FAMILY MEDICINE

## 2023-09-08 PROCEDURE — 37000009 HC ANESTHESIA EA ADD 15 MINS

## 2023-09-08 RX ORDER — PROPOFOL 10 MG/ML
VIAL (ML) INTRAVENOUS
Status: DISCONTINUED | OUTPATIENT
Start: 2023-09-08 | End: 2023-09-08

## 2023-09-08 RX ORDER — DEXTROSE MONOHYDRATE AND SODIUM CHLORIDE 5; .45 G/100ML; G/100ML
INJECTION, SOLUTION INTRAVENOUS CONTINUOUS
Status: DISCONTINUED | OUTPATIENT
Start: 2023-09-08 | End: 2023-09-09 | Stop reason: HOSPADM

## 2023-09-08 RX ORDER — LIDOCAINE HYDROCHLORIDE 20 MG/ML
INJECTION INTRAVENOUS
Status: DISCONTINUED | OUTPATIENT
Start: 2023-09-08 | End: 2023-09-08

## 2023-09-08 RX ADMIN — DEXTROSE AND SODIUM CHLORIDE: 5; 450 INJECTION, SOLUTION INTRAVENOUS at 07:09

## 2023-09-08 RX ADMIN — LIDOCAINE HYDROCHLORIDE 100 MG: 20 INJECTION, SOLUTION INTRAVENOUS at 08:09

## 2023-09-08 RX ADMIN — PROPOFOL 80 MG: 10 INJECTION, EMULSION INTRAVENOUS at 08:09

## 2023-09-08 RX ADMIN — PROPOFOL 110 MG: 10 INJECTION, EMULSION INTRAVENOUS at 08:09

## 2023-09-08 NOTE — ANESTHESIA POSTPROCEDURE EVALUATION
Anesthesia Post Evaluation    Patient: Kane Gutierrez    Procedure(s) Performed: * No procedures listed *    Final Anesthesia Type: MAC      Patient location during evaluation: floor  Patient participation: Yes- Able to Participate  Level of consciousness: awake and alert, awake and oriented  Post-procedure vital signs: reviewed and stable  Pain management: adequate  Airway patency: patent    PONV status at discharge: No PONV  Anesthetic complications: no      Cardiovascular status: blood pressure returned to baseline  Respiratory status: unassisted, room air and spontaneous ventilation  Hydration status: euvolemic  Follow-up not needed.          Vitals Value Taken Time   /80 09/08/23 0716   Temp 36.4 °C (97.6 °F) 09/08/23 0716   Pulse 77 09/08/23 0716   Resp 18 09/08/23 0716   SpO2 99 % 09/08/23 0716         No case tracking events are documented in the log.      Pain/Carlos Score: No data recorded

## 2023-09-08 NOTE — PLAN OF CARE
PT IN ROOM AFTER PROCEDURE, AAOx3; ABD SOFT AND NON DISTENDED; PT IS NOT PASSING GAS; PT HAS NO C/O WITH FAMILY AT SIDE IN STABLE CONDITION

## 2023-09-08 NOTE — DISCHARGE INSTRUCTIONS
Follow-up with Dr CAPPS AS NEEDED  Diet: as tolerated  Activity:  decrease activity today, no driving today, resume all activity tomorrow  Notify MD:  increased swelling of abdomen, excessive nausea/vomiting, excessive bright red bleeding from rectum  Medications:  continue your home medications. Keep a list of your home medications at all times for emergencies.

## 2023-09-08 NOTE — PLAN OF CARE
PT PASSING GAS AND WAS GIVEN SIPS OF WATER THAT WAS TOLERATED WITHOUT NAUSEA; UP TO BATHROOM, IN STABLE CONDITION WITHOUT C/O

## 2023-09-08 NOTE — DISCHARGE SUMMARY
[facility]  [facility]  Short Stay    Colonoscopy      OUTCOME: Patient tolerated treatment/procedure well without complication and is now ready for discharge.    DISPOSITION: Home or Self Care    FINAL DIAGNOSIS:  <principal problem not specified>    FOLLOWUP: In clinic    DISCHARGE INSTRUCTIONS:  No discharge procedures on file.      Clinical Reference Documents Added to Patient Instructions         Document    COLONOSCOPY (ENGLISH)    COLONOSCOPY DISCHARGE INSTRUCTIONS (ENGLISH)            T

## 2023-09-08 NOTE — PLAN OF CARE
PT SITTING UP IN BED WITH FAMILY AT SIDE; PT IS NOT PASSING GAS AND WAS INSTRUCTED THAT HE WOULD BE GIVEN SOMETHING TO DRINK ONCE HE PASSES GAS AND HE VERBALIZED UNDERSTANDING

## 2023-11-09 DIAGNOSIS — E11.22 TYPE 2 DIABETES MELLITUS WITH STAGE 3A CHRONIC KIDNEY DISEASE, WITHOUT LONG-TERM CURRENT USE OF INSULIN: Primary | ICD-10-CM

## 2023-11-09 DIAGNOSIS — N18.31 TYPE 2 DIABETES MELLITUS WITH STAGE 3A CHRONIC KIDNEY DISEASE, WITHOUT LONG-TERM CURRENT USE OF INSULIN: Primary | ICD-10-CM

## 2023-11-09 RX ORDER — PIOGLITAZONEHYDROCHLORIDE 30 MG/1
30 TABLET ORAL
Qty: 90 TABLET | Refills: 3 | Status: SHIPPED | OUTPATIENT
Start: 2023-11-09

## 2023-11-14 PROCEDURE — 85025 COMPLETE CBC W/AUTO DIFF WBC: CPT | Performed by: FAMILY MEDICINE

## 2023-11-14 PROCEDURE — 80061 LIPID PANEL: CPT | Performed by: FAMILY MEDICINE

## 2023-11-14 PROCEDURE — 80053 COMPREHEN METABOLIC PANEL: CPT | Performed by: FAMILY MEDICINE

## 2023-11-14 PROCEDURE — 83036 HEMOGLOBIN GLYCOSYLATED A1C: CPT | Performed by: FAMILY MEDICINE

## 2023-11-14 PROCEDURE — 84153 ASSAY OF PSA TOTAL: CPT | Performed by: FAMILY MEDICINE

## 2023-11-22 ENCOUNTER — OFFICE VISIT (OUTPATIENT)
Dept: FAMILY MEDICINE | Facility: CLINIC | Age: 63
End: 2023-11-22
Payer: MEDICAID

## 2023-11-22 VITALS
DIASTOLIC BLOOD PRESSURE: 64 MMHG | HEIGHT: 74 IN | OXYGEN SATURATION: 99 % | TEMPERATURE: 98 F | HEART RATE: 72 BPM | SYSTOLIC BLOOD PRESSURE: 136 MMHG | WEIGHT: 256.63 LBS | BODY MASS INDEX: 32.94 KG/M2

## 2023-11-22 DIAGNOSIS — Z23 ENCOUNTER FOR IMMUNIZATION: ICD-10-CM

## 2023-11-22 DIAGNOSIS — N18.31 TYPE 2 DIABETES MELLITUS WITH STAGE 3A CHRONIC KIDNEY DISEASE, WITHOUT LONG-TERM CURRENT USE OF INSULIN: Primary | ICD-10-CM

## 2023-11-22 DIAGNOSIS — L30.9 DERMATITIS: ICD-10-CM

## 2023-11-22 DIAGNOSIS — N18.31 STAGE 3A CHRONIC KIDNEY DISEASE: ICD-10-CM

## 2023-11-22 DIAGNOSIS — I73.9 CLAUDICATION: ICD-10-CM

## 2023-11-22 DIAGNOSIS — E11.22 TYPE 2 DIABETES MELLITUS WITH STAGE 3A CHRONIC KIDNEY DISEASE, WITHOUT LONG-TERM CURRENT USE OF INSULIN: Primary | ICD-10-CM

## 2023-11-22 DIAGNOSIS — E78.2 MIXED HYPERLIPIDEMIA: Chronic | ICD-10-CM

## 2023-11-22 DIAGNOSIS — I10 PRIMARY HYPERTENSION: Chronic | ICD-10-CM

## 2023-11-22 DIAGNOSIS — D69.6 THROMBOCYTOPENIA: ICD-10-CM

## 2023-11-22 LAB
CREAT UR-MCNC: 38.7 MG/DL (ref 63–166)
MICROALBUMIN UR-MCNC: 42.3 UG/ML
MICROALBUMIN/CREAT RATIO PNL UR: 109.3 MG/GM CR (ref 0–30)

## 2023-11-22 PROCEDURE — 1160F PR REVIEW ALL MEDS BY PRESCRIBER/CLIN PHARMACIST DOCUMENTED: ICD-10-PCS | Mod: CPTII,,, | Performed by: FAMILY MEDICINE

## 2023-11-22 PROCEDURE — 1160F RVW MEDS BY RX/DR IN RCRD: CPT | Mod: CPTII,,, | Performed by: FAMILY MEDICINE

## 2023-11-22 PROCEDURE — 2023F DILAT RTA XM W/O RTNOPTHY: CPT | Mod: CPTII,,, | Performed by: FAMILY MEDICINE

## 2023-11-22 PROCEDURE — 3078F DIAST BP <80 MM HG: CPT | Mod: CPTII,,, | Performed by: FAMILY MEDICINE

## 2023-11-22 PROCEDURE — 90677 PCV20 VACCINE IM: CPT | Mod: ,,, | Performed by: FAMILY MEDICINE

## 2023-11-22 PROCEDURE — 90677 PNEUMOCOCCAL CONJUGATE VACCINE 20-VALENT: ICD-10-PCS | Mod: ,,, | Performed by: FAMILY MEDICINE

## 2023-11-22 PROCEDURE — 3008F PR BODY MASS INDEX (BMI) DOCUMENTED: ICD-10-PCS | Mod: CPTII,,, | Performed by: FAMILY MEDICINE

## 2023-11-22 PROCEDURE — 99214 OFFICE O/P EST MOD 30 MIN: CPT | Mod: 25,,, | Performed by: FAMILY MEDICINE

## 2023-11-22 PROCEDURE — 3044F HG A1C LEVEL LT 7.0%: CPT | Mod: CPTII,,, | Performed by: FAMILY MEDICINE

## 2023-11-22 PROCEDURE — 3078F PR MOST RECENT DIASTOLIC BLOOD PRESSURE < 80 MM HG: ICD-10-PCS | Mod: CPTII,,, | Performed by: FAMILY MEDICINE

## 2023-11-22 PROCEDURE — 90471 PNEUMOCOCCAL CONJUGATE VACCINE 20-VALENT: ICD-10-PCS | Mod: ,,, | Performed by: FAMILY MEDICINE

## 2023-11-22 PROCEDURE — 1159F PR MEDICATION LIST DOCUMENTED IN MEDICAL RECORD: ICD-10-PCS | Mod: CPTII,,, | Performed by: FAMILY MEDICINE

## 2023-11-22 PROCEDURE — 3008F BODY MASS INDEX DOCD: CPT | Mod: CPTII,,, | Performed by: FAMILY MEDICINE

## 2023-11-22 PROCEDURE — 3075F PR MOST RECENT SYSTOLIC BLOOD PRESS GE 130-139MM HG: ICD-10-PCS | Mod: CPTII,,, | Performed by: FAMILY MEDICINE

## 2023-11-22 PROCEDURE — 90471 IMMUNIZATION ADMIN: CPT | Mod: ,,, | Performed by: FAMILY MEDICINE

## 2023-11-22 PROCEDURE — 99214 PR OFFICE/OUTPT VISIT, EST, LEVL IV, 30-39 MIN: ICD-10-PCS | Mod: 25,,, | Performed by: FAMILY MEDICINE

## 2023-11-22 PROCEDURE — 3044F PR MOST RECENT HEMOGLOBIN A1C LEVEL <7.0%: ICD-10-PCS | Mod: CPTII,,, | Performed by: FAMILY MEDICINE

## 2023-11-22 PROCEDURE — 82043 UR ALBUMIN QUANTITATIVE: CPT | Performed by: FAMILY MEDICINE

## 2023-11-22 PROCEDURE — 3075F SYST BP GE 130 - 139MM HG: CPT | Mod: CPTII,,, | Performed by: FAMILY MEDICINE

## 2023-11-22 PROCEDURE — 2023F PR DILATED RETINAL EXAM W/O EVID OF RETINOPATHY: ICD-10-PCS | Mod: CPTII,,, | Performed by: FAMILY MEDICINE

## 2023-11-22 PROCEDURE — 1159F MED LIST DOCD IN RCRD: CPT | Mod: CPTII,,, | Performed by: FAMILY MEDICINE

## 2023-11-22 RX ORDER — TRIAMCINOLONE ACETONIDE 1 MG/G
CREAM TOPICAL 2 TIMES DAILY
Qty: 80 G | Refills: 0 | Status: SHIPPED | OUTPATIENT
Start: 2023-11-22 | End: 2024-03-25

## 2023-11-22 RX ORDER — DORZOLAMIDE HYDROCHLORIDE AND TIMOLOL MALEATE 20; 5 MG/ML; MG/ML
1 SOLUTION/ DROPS OPHTHALMIC 2 TIMES DAILY
COMMUNITY
Start: 2023-11-09

## 2023-11-22 NOTE — PROGRESS NOTES
"SUBJECTIVE:  HPI    Kane Gutierrez is a 63 y.o. male here for Follow-up (3 month, lab results)On chronic health conditions including diabetes and hypertension.    He is doing well.  No current problems or concerns.  He received 3 injections for his lumbar radiculitis without any significant relief.  He is now seeing to other surgeons.     He complains of bilateral claudication.  He also has some pruritic sores on the anterior shins.      Wests allergies, medications, history, and problem list were updated as appropriate.    ROS:  Pertinent ROS as above, otherwise negative    OBJECTIVE:  Vital signs  Visit Vitals  /64 (BP Location: Left arm, Patient Position: Sitting, BP Method: Medium (Manual))   Pulse 72   Temp 97.9 °F (36.6 °C) (Temporal)   Ht 6' 2.02" (1.88 m)   Wt 116.4 kg (256 lb 9.6 oz)   SpO2 99%   BMI 32.93 kg/m²          PHYSICAL EXAM:  General:  Awake, alert, no acute distress   Cardiovascular: Regular rate and rhythm.  No murmurs.  Respiratory: Clear to auscultation bilaterally, normal effort  Extremities:  Trace bilateral lower extremity pretibial edema with sparse hair growth, shallow, 5 mm ulcerations on the anterior shins and slightly decreased pulses at the ankles      Chemistry:  Lab Results   Component Value Date     11/14/2023    K 4.7 11/14/2023    BUN 17.0 11/14/2023    CREATININE 1.26 (H) 11/14/2023    EGFRNORACEVR 64 11/14/2023    GLUCOSE 114 11/14/2023    CALCIUM 9.5 11/14/2023    ALKPHOS 61 11/14/2023    AST 27 11/14/2023    ALT 23 11/14/2023    TSH 2.45 09/29/2021    PSA 0.78 11/14/2023        Lab Results   Component Value Date    HGBA1C 6.3 (H) 11/14/2023        Hematology:  Lab Results   Component Value Date    WBC 5.87 11/14/2023    HGB 14.7 11/14/2023    MCV 87.6 11/14/2023     (L) 11/14/2023       Lipid Panel:  Lab Results   Component Value Date    CHOL 143 11/14/2023    HDL 46 11/14/2023    DLDL 69.3 11/14/2023    TRIG 157 11/14/2023        ASSESSMENT/PLAN:  1. " "Type 2 diabetes mellitus with stage 3a chronic kidney disease, without long-term current use of insulin  Overview:  Lab Results   Component Value Date    HGBA1C 6.3 (H) 11/14/2023     Diagnosed 1990s    Assessment & Plan:  Hemoglobin A1c at goal on pioglitazone 30 mg daily, Ozempic mg weekly, Xigduo XR 5/1000 mg 2 tablets daily    Orders:  -     Microalbumin/Creatinine Ratio, Urine  -     Hemoglobin A1C; Future; Expected date: 03/21/2024    2. Primary hypertension  Assessment & Plan:  Controlled with amlodipine 10 mg daily, valsartan hydrochlorothiazide 320/25 mg daily    Orders:  -     Comprehensive Metabolic Panel; Future; Expected date: 03/22/2024    3. Mixed hyperlipidemia  Overview:  Lab Results   Component Value Date    CHOL 143 11/14/2023    CHOL 90 08/15/2023    CHOL 101 05/01/2023     Lab Results   Component Value Date    HDL 46 11/14/2023    HDL 25 (L) 08/15/2023    HDL 37 (L) 05/01/2023     No results found for: "LDLCALC"  Lab Results   Component Value Date    DLDL 69.3 11/14/2023    DLDL 43.9 08/15/2023    DLDL 41.3 05/01/2023     Lab Results   Component Value Date    TRIG 157 11/14/2023    TRIG 133 08/15/2023    TRIG 119 05/01/2023       f1 No results found for: "CHOLHDL"      Assessment & Plan:  LDL cholesterol at goal of less than 70 on rosuvastatin 10 mg daily    Orders:  -     Lipid Panel; Future; Expected date: 03/22/2024    4. Stage 3a chronic kidney disease  Overview:  Lab Results   Component Value Date    CREATININE 1.26 (H) 11/14/2023    EGFRNONAA 69 01/07/2022    EGFRNONAA 89 09/29/2021         Assessment & Plan:  GFR improved to 64    Avoid NSAIDs     Blood pressure control      5. Thrombocytopenia  Overview:  Lab Results   Component Value Date    WBC 5.87 11/14/2023    HGB 14.7 11/14/2023    HCT 45.2 11/14/2023    MCV 87.6 11/14/2023     (L) 11/14/2023             Assessment & Plan:  Improved, continue surveillance    Orders:  -     CBC Auto Differential; Future; Expected date: " 03/22/2024    6. Encounter for immunization  -     (In Office Administered) Pneumococcal Conjugate Vaccine (20 Valent) (IM) (Preferred)    7. Claudication  -     US Lower Extremity Arteries Bilateral; Future; Expected date: 11/22/2023    8. Dermatitis  -     triamcinolone acetonide 0.1% (KENALOG) 0.1 % cream; Apply topically 2 (two) times daily. for 7 days  Dispense: 80 g; Refill: 0        Follow Up:  Follow up in about 4 months (around 3/22/2024) for Fasting labs, Follow-up.

## 2023-11-22 NOTE — ASSESSMENT & PLAN NOTE
Hemoglobin A1c at goal on pioglitazone 30 mg daily, Ozempic mg weekly, Xigduo XR 5/1000 mg 2 tablets daily

## 2023-11-28 PROBLEM — E11.21 DIABETIC NEPHROPATHY ASSOCIATED WITH TYPE 2 DIABETES MELLITUS: Status: ACTIVE | Noted: 2023-11-28

## 2023-12-05 ENCOUNTER — HOSPITAL ENCOUNTER (OUTPATIENT)
Dept: RADIOLOGY | Facility: HOSPITAL | Age: 63
Discharge: HOME | End: 2023-12-05
Attending: FAMILY MEDICINE

## 2023-12-05 DIAGNOSIS — I73.9 CLAUDICATION: ICD-10-CM

## 2023-12-05 PROCEDURE — 93925 LOWER EXTREMITY STUDY: CPT | Mod: TC

## 2023-12-06 ENCOUNTER — TELEPHONE (OUTPATIENT)
Dept: FAMILY MEDICINE | Facility: CLINIC | Age: 63
End: 2023-12-06

## 2023-12-06 PROBLEM — I73.9 PERIPHERAL ARTERIAL DISEASE: Status: ACTIVE | Noted: 2023-12-06

## 2023-12-06 NOTE — TELEPHONE ENCOUNTER
----- Message from Haresh Meyer MD sent at 12/6/2023  2:23 PM CST -----  His ultrasound showed some plaquing in the arteries of the lower extremities but no significant blockages.  No changes in his current treatment recommendations or medications.

## 2024-02-20 DIAGNOSIS — I10 PRIMARY HYPERTENSION: ICD-10-CM

## 2024-02-20 RX ORDER — VALSARTAN AND HYDROCHLOROTHIAZIDE 320; 25 MG/1; MG/1
1 TABLET, FILM COATED ORAL
Qty: 90 TABLET | Refills: 3 | Status: SHIPPED | OUTPATIENT
Start: 2024-02-20

## 2024-03-18 PROCEDURE — 83036 HEMOGLOBIN GLYCOSYLATED A1C: CPT | Performed by: FAMILY MEDICINE

## 2024-03-18 PROCEDURE — 80053 COMPREHEN METABOLIC PANEL: CPT | Performed by: FAMILY MEDICINE

## 2024-03-18 PROCEDURE — 85025 COMPLETE CBC W/AUTO DIFF WBC: CPT | Performed by: FAMILY MEDICINE

## 2024-03-18 PROCEDURE — 80061 LIPID PANEL: CPT | Performed by: FAMILY MEDICINE

## 2024-03-25 ENCOUNTER — OFFICE VISIT (OUTPATIENT)
Dept: FAMILY MEDICINE | Facility: CLINIC | Age: 64
End: 2024-03-25
Payer: MEDICARE

## 2024-03-25 VITALS
BODY MASS INDEX: 34.24 KG/M2 | WEIGHT: 266.81 LBS | TEMPERATURE: 98 F | DIASTOLIC BLOOD PRESSURE: 70 MMHG | SYSTOLIC BLOOD PRESSURE: 138 MMHG | OXYGEN SATURATION: 95 % | HEIGHT: 74 IN | HEART RATE: 81 BPM

## 2024-03-25 DIAGNOSIS — E11.51 TYPE 2 DIABETES MELLITUS WITH DIABETIC PERIPHERAL ANGIOPATHY WITHOUT GANGRENE, WITHOUT LONG-TERM CURRENT USE OF INSULIN: Primary | ICD-10-CM

## 2024-03-25 DIAGNOSIS — M54.16 LUMBAR RADICULOPATHY: ICD-10-CM

## 2024-03-25 DIAGNOSIS — N40.0 BPH WITHOUT OBSTRUCTION/LOWER URINARY TRACT SYMPTOMS: ICD-10-CM

## 2024-03-25 DIAGNOSIS — N18.31 STAGE 3A CHRONIC KIDNEY DISEASE: ICD-10-CM

## 2024-03-25 DIAGNOSIS — I10 BENIGN ESSENTIAL HYPERTENSION: ICD-10-CM

## 2024-03-25 DIAGNOSIS — E78.2 MIXED HYPERLIPIDEMIA: Chronic | ICD-10-CM

## 2024-03-25 DIAGNOSIS — N52.1 ERECTILE DYSFUNCTION DUE TO DISEASES CLASSIFIED ELSEWHERE: ICD-10-CM

## 2024-03-25 DIAGNOSIS — I73.9 PERIPHERAL ARTERIAL DISEASE: ICD-10-CM

## 2024-03-25 DIAGNOSIS — D69.6 THROMBOCYTOPENIA: ICD-10-CM

## 2024-03-25 PROCEDURE — 99214 OFFICE O/P EST MOD 30 MIN: CPT | Mod: ,,, | Performed by: FAMILY MEDICINE

## 2024-03-25 NOTE — ASSESSMENT & PLAN NOTE
Hemoglobin A1c at goal on pioglitazone 30 mg daily, Ozempic 0.5 mg weekly, Xigduo XR 5/1000 mg 2 tablets daily

## 2024-03-25 NOTE — PROGRESS NOTES
"SUBJECTIVE:  HPI    Kane Gutierrez is a 63 y.o. male here for Follow-up (Lab results) on chronic health conditions as outlined in the assessment and plan below.      He has been doing pretty well.  He received 2 injections in his lumbar spine in the 1st 1 helped.  He has a planned injection for nerve block with his neurosurgeon in [facility].    He otherwise feels well.  No chest pain or shortness a breath.  No abdominal pain.  He is compliant with his medications.      Kane's allergies, medications, history, and problem list were updated as appropriate.    ROS:  Pertinent ROS as above, otherwise negative    OBJECTIVE:  Vital signs  Visit Vitals  /70 (BP Location: Left arm, Patient Position: Sitting, BP Method: Large (Manual))   Pulse 81   Temp 98.4 °F (36.9 °C) (Oral)   Ht 6' 2.02" (1.88 m)   Wt 121 kg (266 lb 12.8 oz)   SpO2 95%   BMI 34.24 kg/m²          PHYSICAL EXAM:  General:  Awake, alert, no acute distress, antalgic gait and posture   Eyes:  Pupils equal, round, reactive to light.  Conjunctiva normal bilaterally.  Neck:  No lymphadenopathy, no bruit  Cardiovascular: Regular rate and rhythm.  No murmurs.  Respiratory: Clear to auscultation bilaterally, normal effort  Extremities:  No peripheral edema, no cyanosis  Skin: No rashes    Chemistry:  Lab Results   Component Value Date     03/18/2024    K 4.3 03/18/2024    BUN 18.0 03/18/2024    CREATININE 1.45 (H) 03/18/2024    EGFRNORACEVR 54 03/18/2024    GLUCOSE 125 (H) 03/18/2024    CALCIUM 9.8 03/18/2024    ALKPHOS 62 03/18/2024    AST 26 03/18/2024    ALT 21 03/18/2024    TSH 2.45 09/29/2021    PSA 0.78 11/14/2023        Lab Results   Component Value Date    HGBA1C 6.5 (H) 03/18/2024        Hematology:  Lab Results   Component Value Date    WBC 6.01 03/18/2024    HGB 15.8 03/18/2024    MCV 87.8 03/18/2024     (L) 03/18/2024       Lipid Panel:  Lab Results   Component Value Date    CHOL 124 03/18/2024    HDL 43 03/18/2024    DLDL " "58.2 03/18/2024    TRIG 177 03/18/2024        ASSESSMENT/PLAN:  1. Type 2 diabetes mellitus with diabetic peripheral angiopathy without gangrene, without long-term current use of insulin  Overview:  Lab Results   Component Value Date    HGBA1C 6.5 (H) 03/18/2024     Diagnosed 1990s    Assessment & Plan:  Hemoglobin A1c at goal on pioglitazone 30 mg daily, Ozempic 0.5 mg weekly, Xigduo XR 5/1000 mg 2 tablets daily    Orders:  -     Hemoglobin A1C; Future; Expected date: 09/25/2024  -     Microalbumin/Creatinine Ratio, Urine; Future; Expected date: 09/25/2024    2. Peripheral arterial disease  Overview:  December 2023:  Scattered plaquing throughout the lower extremities without significant focal stenosis, normal ABIs on ultrasound    Assessment & Plan:  Aspirin, statin with goal LDL less than 70      3. Stage 3a chronic kidney disease  Overview:  Lab Results   Component Value Date    CREATININE 1.45 (H) 03/18/2024    EGFRNONAA 69 01/07/2022    EGFRNONAA 89 09/29/2021         Assessment & Plan:  GFR 54    Avoid NSAIDs     Blood pressure control      4. Mixed hyperlipidemia  Overview:  Lab Results   Component Value Date    CHOL 124 03/18/2024    CHOL 143 11/14/2023    CHOL 90 08/15/2023     Lab Results   Component Value Date    HDL 43 03/18/2024    HDL 46 11/14/2023    HDL 25 (L) 08/15/2023     No results found for: "LDLCALC"  Lab Results   Component Value Date    DLDL 58.2 03/18/2024    DLDL 69.3 11/14/2023    DLDL 43.9 08/15/2023     Lab Results   Component Value Date    TRIG 177 03/18/2024    TRIG 157 11/14/2023    TRIG 133 08/15/2023       f1 No results found for: "CHOLHDL"      Assessment & Plan:  Goal LDL <70     Continue rosuvastatin 10 mg daily    Orders:  -     Lipid Panel; Future; Expected date: 09/25/2024    5. Thrombocytopenia  Overview:  Lab Results   Component Value Date    WBC 6.01 03/18/2024    HGB 15.8 03/18/2024    HCT 50.4 03/18/2024    MCV 87.8 03/18/2024     (L) 03/18/2024 "             Assessment & Plan:  Continue surveillance    Orders:  -     CBC Auto Differential; Future; Expected date: 09/25/2024    6. Benign essential hypertension  Assessment & Plan:  Controlled on amlodipine 10 mg daily, valsartan hydrochlorothiazide 320/25 mg daily    Orders:  -     Comprehensive Metabolic Panel; Future; Expected date: 09/25/2024    7. Erectile dysfunction due to diseases classified elsewhere  -     Prostate Specific Antigen, Diagnostic; Future; Expected date: 09/25/2024    8. BPH without obstruction/lower urinary tract symptoms  -     Prostate Specific Antigen, Diagnostic; Future; Expected date: 09/25/2024    9. Lumbar radiculopathy  Assessment & Plan:  Continue neurosurgical follow up with Dr. Mcclure          Follow Up:  Follow up in about 6 months (around 9/25/2024) for chronic health conditions, Fasting labs.

## 2024-03-26 ENCOUNTER — TELEPHONE (OUTPATIENT)
Dept: FAMILY MEDICINE | Facility: CLINIC | Age: 64
End: 2024-03-26
Payer: MEDICARE

## 2024-03-26 DIAGNOSIS — I10 HYPERTENSION, UNSPECIFIED TYPE: ICD-10-CM

## 2024-03-26 RX ORDER — AMLODIPINE BESYLATE 10 MG/1
10 TABLET ORAL DAILY
Qty: 90 TABLET | Refills: 3 | Status: SHIPPED | OUTPATIENT
Start: 2024-03-26

## 2024-05-31 DIAGNOSIS — E78.2 MIXED HYPERLIPIDEMIA: ICD-10-CM

## 2024-06-03 RX ORDER — ROSUVASTATIN CALCIUM 10 MG/1
TABLET, COATED ORAL
Qty: 30 TABLET | Refills: 11 | Status: SHIPPED | OUTPATIENT
Start: 2024-06-03

## 2024-06-12 DIAGNOSIS — E11.69 TYPE 2 DIABETES MELLITUS WITH OTHER SPECIFIED COMPLICATION: ICD-10-CM

## 2024-06-12 RX ORDER — DAPAGLIFLOZIN AND METFORMIN HYDROCHLORIDE 5; 1000 MG/1; MG/1
TABLET, FILM COATED, EXTENDED RELEASE ORAL
Qty: 60 TABLET | Refills: 11 | Status: SHIPPED | OUTPATIENT
Start: 2024-06-12

## 2024-08-27 ENCOUNTER — TELEPHONE (OUTPATIENT)
Dept: FAMILY MEDICINE | Facility: CLINIC | Age: 64
End: 2024-08-27
Payer: MEDICARE

## 2024-08-27 NOTE — TELEPHONE ENCOUNTER
Eneida from PT called and asked if we could access pt for BP, he called in a reading of 130/82, and then came by office and I manually checked it today and it was 136/72, form faxed to PT for clearance

## 2024-09-25 PROCEDURE — 80061 LIPID PANEL: CPT | Performed by: FAMILY MEDICINE

## 2024-09-25 PROCEDURE — 84153 ASSAY OF PSA TOTAL: CPT | Performed by: FAMILY MEDICINE

## 2024-09-25 PROCEDURE — 85025 COMPLETE CBC W/AUTO DIFF WBC: CPT | Performed by: FAMILY MEDICINE

## 2024-09-25 PROCEDURE — 82043 UR ALBUMIN QUANTITATIVE: CPT | Performed by: FAMILY MEDICINE

## 2024-09-25 PROCEDURE — 80053 COMPREHEN METABOLIC PANEL: CPT | Performed by: FAMILY MEDICINE

## 2024-09-25 PROCEDURE — 82570 ASSAY OF URINE CREATININE: CPT | Performed by: FAMILY MEDICINE

## 2024-09-25 PROCEDURE — 83036 HEMOGLOBIN GLYCOSYLATED A1C: CPT | Performed by: FAMILY MEDICINE

## 2024-10-02 ENCOUNTER — OFFICE VISIT (OUTPATIENT)
Dept: FAMILY MEDICINE | Facility: CLINIC | Age: 64
End: 2024-10-02
Payer: MEDICARE

## 2024-10-02 VITALS
HEIGHT: 74 IN | DIASTOLIC BLOOD PRESSURE: 68 MMHG | WEIGHT: 274.19 LBS | BODY MASS INDEX: 35.19 KG/M2 | HEART RATE: 80 BPM | SYSTOLIC BLOOD PRESSURE: 170 MMHG | OXYGEN SATURATION: 96 % | TEMPERATURE: 97 F

## 2024-10-02 DIAGNOSIS — D69.6 THROMBOCYTOPENIA: ICD-10-CM

## 2024-10-02 DIAGNOSIS — E78.2 MIXED HYPERLIPIDEMIA: Chronic | ICD-10-CM

## 2024-10-02 DIAGNOSIS — I73.9 PERIPHERAL ARTERIAL DISEASE: ICD-10-CM

## 2024-10-02 DIAGNOSIS — N18.31 STAGE 3A CHRONIC KIDNEY DISEASE: ICD-10-CM

## 2024-10-02 DIAGNOSIS — Z23 ENCOUNTER FOR IMMUNIZATION: ICD-10-CM

## 2024-10-02 DIAGNOSIS — E11.51 TYPE 2 DIABETES MELLITUS WITH DIABETIC PERIPHERAL ANGIOPATHY WITHOUT GANGRENE, WITHOUT LONG-TERM CURRENT USE OF INSULIN: Primary | ICD-10-CM

## 2024-10-02 DIAGNOSIS — I10 BENIGN ESSENTIAL HYPERTENSION: ICD-10-CM

## 2024-10-02 PROCEDURE — 90656 IIV3 VACC NO PRSV 0.5 ML IM: CPT | Mod: ,,, | Performed by: FAMILY MEDICINE

## 2024-10-02 PROCEDURE — 99214 OFFICE O/P EST MOD 30 MIN: CPT | Mod: ,,, | Performed by: FAMILY MEDICINE

## 2024-10-02 PROCEDURE — G0008 ADMIN INFLUENZA VIRUS VAC: HCPCS | Mod: ,,, | Performed by: FAMILY MEDICINE

## 2024-10-02 RX ORDER — TIRZEPATIDE 5 MG/.5ML
5 INJECTION, SOLUTION SUBCUTANEOUS
Qty: 2 ML | Refills: 11 | Status: SHIPPED | OUTPATIENT
Start: 2024-10-02 | End: 2025-10-02

## 2024-10-02 RX ORDER — LATANOPROST 50 UG/ML
1 SOLUTION/ DROPS OPHTHALMIC NIGHTLY
COMMUNITY
Start: 2024-06-25

## 2024-10-02 RX ORDER — CHLORTHALIDONE 25 MG/1
25 TABLET ORAL DAILY
Qty: 90 TABLET | Refills: 3 | Status: SHIPPED | OUTPATIENT
Start: 2024-10-02 | End: 2025-10-02

## 2024-10-02 RX ORDER — VALSARTAN 320 MG/1
320 TABLET ORAL DAILY
Qty: 90 TABLET | Refills: 3 | Status: SHIPPED | OUTPATIENT
Start: 2024-10-02 | End: 2025-10-02

## 2024-10-02 NOTE — PROGRESS NOTES
"SUBJECTIVE:  HPI    Kane Gutierrez is a 64 y.o. male here for follow up on chronic health conditions and recent labs.  See assessment and plan below.      This morning, he reports that he did not take his blood pressure medication.  He reports that his blood pressures have been okay at home.      No chest pain, shortness on breath, abdominal pain.      He has recently been receiving lumbar injections and possibly radiofrequency ablation so he has not been taking his Ozempic for several months now.  Additionally, he reports that when he was taking his Ozempic it was causing some nausea and vomiting.      Kane's allergies, medications, history, and problem list were updated as appropriate.    ROS:  Pertinent ROS as above, otherwise negative    OBJECTIVE:  Vital signs  Visit Vitals  BP (!) 170/68   Pulse 80   Temp 97.3 °F (36.3 °C) (Temporal)   Ht 6' 2.02" (1.88 m)   Wt 124.4 kg (274 lb 3.2 oz)   SpO2 96%   BMI 35.19 kg/m²          PHYSICAL EXAM:  General: Awake, alert, no acute distress, his weight is up 8 lb from March and 18 lb from last November  Neck:  No bruits, no masses  Cardiovascular:  Regular rhythm.  Normal rate.  No murmurs.  Respiratory: Clear to auscultation bilaterally, normal effort      Chemistry:  Lab Results   Component Value Date     09/25/2024    K 4.2 09/25/2024    BUN 18 09/25/2024    CREATININE 1.52 (H) 09/25/2024    EGFRNORACEVR 51 09/25/2024    GLUCOSE 152 (H) 09/25/2024    CALCIUM 9.6 09/25/2024    ALKPHOS 64 09/25/2024    AST 26 09/25/2024    ALT 27 09/25/2024    TSH 2.45 09/29/2021    PSA 0.67 09/25/2024        Lab Results   Component Value Date    HGBA1C 8.4 (H) 09/25/2024        Hematology:  Lab Results   Component Value Date    WBC 6.25 09/25/2024    HGB 14.4 09/25/2024    MCV 85.9 09/25/2024    PLT 92 (L) 09/25/2024       Lipid Panel:  Lab Results   Component Value Date    CHOL 122 09/25/2024    HDL 38 (L) 09/25/2024    LDLDIRECT 50.2 09/25/2024    TRIG 170 09/25/2024    "     ASSESSMENT/PLAN:  1. Type 2 diabetes mellitus with diabetic peripheral angiopathy without gangrene, without long-term current use of insulin  Overview:  Lab Results   Component Value Date    HGBA1C 8.4 (H) 09/25/2024     Diagnosed 1990s    Assessment & Plan:  He has had a significant rise in his hemoglobin A1c from 6.5 to 8.4 since his last visit    Continue Xigduo XR 5/1000 mg 2 tablets daily, pioglitazone 30 mg daily     Start Mounjaro 5 mg weekly since he had side effects with Ozempic      Orders:  -     tirzepatide (MOUNJARO) 5 mg/0.5 mL PnIj; Inject 5 mg into the skin every 7 days.  Dispense: 2 mL; Refill: 11  -     Hemoglobin A1C; Future; Expected date: 01/02/2025    2. Peripheral arterial disease  Overview:  December 2023:  Scattered plaquing throughout the lower extremities without significant focal stenosis, normal ABIs on ultrasound    Assessment & Plan:  Aspirin, statin with goal LDL less than 70      3. Benign essential hypertension  Assessment & Plan:  Amlodipine 10 mg daily, valsartan 320 mg    Change hydrochlorothiazide to chlorthalidone 25 mg daily    Checks at home    Orders:  -     Comprehensive Metabolic Panel; Future; Expected date: 01/02/2025  -     chlorthalidone (HYGROTEN) 25 MG Tab; Take 1 tablet (25 mg total) by mouth once daily.  Dispense: 90 tablet; Refill: 3  -     valsartan (DIOVAN) 320 MG tablet; Take 1 tablet (320 mg total) by mouth once daily.  Dispense: 90 tablet; Refill: 3    4. Mixed hyperlipidemia  Overview:  Lab Results   Component Value Date    CHOL 122 09/25/2024    HDL 38 (L) 09/25/2024    LDLDIRECT 50.2 09/25/2024    TRIG 170 09/25/2024            Assessment & Plan:  Goal LDL <70     Continue rosuvastatin 10 mg daily      5. Stage 3a chronic kidney disease  Overview:  Kidney Failure Risk Equation (Tangri)    Kidney Failure Risk at 2 years: 0.6%    Kidney Failure Risk at 5 years: 1.7%    Lab Results   Component Value Date    MICALBCREAT 133.0 (H) 09/25/2024    CREATININE  1.52 (H) 09/25/2024           Assessment & Plan:  Avoid NSAIDs     Blood pressure control      6. Thrombocytopenia  Overview:  Lab Results   Component Value Date    WBC 6.25 09/25/2024    HGB 14.4 09/25/2024    HCT 44.4 09/25/2024    MCV 85.9 09/25/2024    PLT 92 (L) 09/25/2024             Assessment & Plan:  Continue surveillance    Orders:  -     CBC Auto Differential; Future; Expected date: 01/02/2025    7. Encounter for immunization  -     influenza (Flulaval, Fluzone, Fluarix) 45 mcg/0.5 mL IM vaccine (> or = 6 mo) 0.5 mL        Follow Up:  Follow up in about 3 months (around 1/2/2025) for chronic health conditions, Fasting labs.

## 2024-10-02 NOTE — ASSESSMENT & PLAN NOTE
Amlodipine 10 mg daily, valsartan 320 mg    Change hydrochlorothiazide to chlorthalidone 25 mg daily    Checks at home

## 2024-10-02 NOTE — ASSESSMENT & PLAN NOTE
He has had a significant rise in his hemoglobin A1c from 6.5 to 8.4 since his last visit    Continue Xigduo XR 5/1000 mg 2 tablets daily, pioglitazone 30 mg daily     Start Mounjaro 5 mg weekly since he had side effects with Ozempic

## 2024-10-08 ENCOUNTER — PATIENT OUTREACH (OUTPATIENT)
Facility: CLINIC | Age: 64
End: 2024-10-08
Payer: MEDICARE

## 2024-10-08 LAB
LEFT EYE DM RETINOPATHY: NEGATIVE
RIGHT EYE DM RETINOPATHY: NEGATIVE

## 2024-12-03 DIAGNOSIS — E11.22 TYPE 2 DIABETES MELLITUS WITH STAGE 3A CHRONIC KIDNEY DISEASE, WITHOUT LONG-TERM CURRENT USE OF INSULIN: ICD-10-CM

## 2024-12-03 DIAGNOSIS — N18.31 TYPE 2 DIABETES MELLITUS WITH STAGE 3A CHRONIC KIDNEY DISEASE, WITHOUT LONG-TERM CURRENT USE OF INSULIN: ICD-10-CM

## 2024-12-03 RX ORDER — PIOGLITAZONEHYDROCHLORIDE 30 MG/1
30 TABLET ORAL
Qty: 90 TABLET | Refills: 3 | Status: SHIPPED | OUTPATIENT
Start: 2024-12-03

## 2025-01-15 PROCEDURE — 83036 HEMOGLOBIN GLYCOSYLATED A1C: CPT | Performed by: FAMILY MEDICINE

## 2025-01-15 PROCEDURE — 85025 COMPLETE CBC W/AUTO DIFF WBC: CPT | Performed by: FAMILY MEDICINE

## 2025-01-15 PROCEDURE — 80053 COMPREHEN METABOLIC PANEL: CPT | Performed by: FAMILY MEDICINE

## 2025-01-30 ENCOUNTER — OFFICE VISIT (OUTPATIENT)
Dept: FAMILY MEDICINE | Facility: CLINIC | Age: 65
End: 2025-01-30
Payer: MEDICARE

## 2025-01-30 VITALS
WEIGHT: 269 LBS | BODY MASS INDEX: 34.52 KG/M2 | HEART RATE: 82 BPM | SYSTOLIC BLOOD PRESSURE: 152 MMHG | HEIGHT: 74 IN | DIASTOLIC BLOOD PRESSURE: 78 MMHG | TEMPERATURE: 97 F | OXYGEN SATURATION: 95 %

## 2025-01-30 DIAGNOSIS — E11.51 TYPE 2 DIABETES MELLITUS WITH DIABETIC PERIPHERAL ANGIOPATHY WITHOUT GANGRENE, WITHOUT LONG-TERM CURRENT USE OF INSULIN: Primary | ICD-10-CM

## 2025-01-30 DIAGNOSIS — E11.21 DIABETIC NEPHROPATHY ASSOCIATED WITH TYPE 2 DIABETES MELLITUS: ICD-10-CM

## 2025-01-30 DIAGNOSIS — I10 BENIGN ESSENTIAL HYPERTENSION: ICD-10-CM

## 2025-01-30 DIAGNOSIS — E78.2 MIXED HYPERLIPIDEMIA: Chronic | ICD-10-CM

## 2025-01-30 DIAGNOSIS — R68.89 COLD INTOLERANCE: ICD-10-CM

## 2025-01-30 DIAGNOSIS — N18.31 STAGE 3A CHRONIC KIDNEY DISEASE: ICD-10-CM

## 2025-01-30 DIAGNOSIS — D69.6 THROMBOCYTOPENIA: ICD-10-CM

## 2025-01-30 DIAGNOSIS — I73.9 PERIPHERAL ARTERIAL DISEASE: ICD-10-CM

## 2025-01-30 PROCEDURE — 3052F HG A1C>EQUAL 8.0%<EQUAL 9.0%: CPT | Mod: ,,, | Performed by: FAMILY MEDICINE

## 2025-01-30 PROCEDURE — 99214 OFFICE O/P EST MOD 30 MIN: CPT | Mod: ,,, | Performed by: FAMILY MEDICINE

## 2025-01-30 PROCEDURE — 1159F MED LIST DOCD IN RCRD: CPT | Mod: ,,, | Performed by: FAMILY MEDICINE

## 2025-01-30 PROCEDURE — 3008F BODY MASS INDEX DOCD: CPT | Mod: ,,, | Performed by: FAMILY MEDICINE

## 2025-01-30 PROCEDURE — 3077F SYST BP >= 140 MM HG: CPT | Mod: ,,, | Performed by: FAMILY MEDICINE

## 2025-01-30 PROCEDURE — 3078F DIAST BP <80 MM HG: CPT | Mod: ,,, | Performed by: FAMILY MEDICINE

## 2025-01-30 RX ORDER — AMLODIPINE BESYLATE 10 MG/1
10 TABLET ORAL DAILY
Qty: 90 TABLET | Refills: 3 | Status: SHIPPED | OUTPATIENT
Start: 2025-01-30

## 2025-01-30 RX ORDER — VALSARTAN 320 MG/1
320 TABLET ORAL DAILY
Qty: 90 TABLET | Refills: 3 | Status: SHIPPED | OUTPATIENT
Start: 2025-01-30 | End: 2026-01-30

## 2025-01-30 RX ORDER — NAPROXEN SODIUM 220 MG/1
81 TABLET, FILM COATED ORAL DAILY
Start: 2025-01-30 | End: 2026-01-30

## 2025-01-30 RX ORDER — ROSUVASTATIN CALCIUM 10 MG/1
10 TABLET, COATED ORAL DAILY
Qty: 90 TABLET | Refills: 3 | Status: SHIPPED | OUTPATIENT
Start: 2025-01-30 | End: 2026-01-30

## 2025-01-30 RX ORDER — BLOOD-GLUCOSE SENSOR
1 EACH MISCELLANEOUS 4 TIMES DAILY
Qty: 1 EACH | Refills: 11 | Status: SHIPPED | OUTPATIENT
Start: 2025-01-30 | End: 2026-01-30

## 2025-01-30 RX ORDER — DAPAGLIFLOZIN AND METFORMIN HYDROCHLORIDE 5; 1000 MG/1; MG/1
2 TABLET, FILM COATED, EXTENDED RELEASE ORAL EVERY MORNING
Qty: 180 TABLET | Refills: 3 | Status: SHIPPED | OUTPATIENT
Start: 2025-01-30 | End: 2026-01-30

## 2025-01-30 RX ORDER — CHLORTHALIDONE 50 MG/1
100 TABLET ORAL DAILY
Qty: 180 TABLET | Refills: 3 | Status: SHIPPED | OUTPATIENT
Start: 2025-01-30 | End: 2026-01-30

## 2025-01-30 RX ORDER — ORAL SEMAGLUTIDE 7 MG/1
7 TABLET ORAL DAILY
Qty: 90 TABLET | Refills: 3 | Status: SHIPPED | OUTPATIENT
Start: 2025-01-30 | End: 2026-01-30

## 2025-01-30 RX ORDER — PIOGLITAZONEHYDROCHLORIDE 30 MG/1
30 TABLET ORAL DAILY
Qty: 90 TABLET | Refills: 3 | Status: SHIPPED | OUTPATIENT
Start: 2025-01-30 | End: 2026-01-30

## 2025-01-30 NOTE — ASSESSMENT & PLAN NOTE
He has had a significant rise in his hemoglobin A1c from 6.5 to 8.4 since his last visit    Continue Xigduo XR 5/1000 mg 2 tablets daily, pioglitazone 30 mg daily     Add Rybelsus 7 mg daily    Glucometer for daily blood glucose checks q.a.c. and q.h.s. fasting as needed to try and improve his glucose control as he has had difficulty maintaining tight glycemic control.  We will order a freestyle Ilda system for continuous glucometer readings.

## 2025-01-30 NOTE — PROGRESS NOTES
"SUBJECTIVE:  HPI    Kane Gutierrez is a 64 y.o. male here for Follow-up (labs).   History of Present Illness    CHIEF COMPLAINT:  Patient presents today for follow-up on diabetes management    DIABETES MANAGEMENT:  He has been taking Mounjaro for approximately one month (4 doses), experiencing multiple side effects including GI discomfort with generalized stomach soreness, development of lumps around eyes, and nausea with vomiting particularly at night. He does not check blood sugar and does not have a glucometer. He experiences afternoon drowsiness, particularly when fasting, which he attributes to potentially low blood sugar. He denies awareness of blood sugar fluctuations.    BLOOD PRESSURE:  He has a home blood pressure monitor but questions its accuracy, noting significant variations in readings within short intervals (e.g., 152/80 to 120/75 within three minutes).    GI CONCERNS:  He reports indigestion and reflux after consuming lettuce, leading to reduced lettuce intake. These digestive issues persisted even during injection treatments.    TEMPERATURE REGULATION:  He reports feeling consistently cold, maintaining his home temperature at 72 Fahrenheit, which others perceive as warm. He regularly wears long-sleeved shirts to manage this persistent coldness.    MEDICATIONS:  He reports compliance with all prescribed medications but requires refills by next Monday.        Kane's allergies, medications, history, and problem list were updated as appropriate.    ROS:  Pertinent ROS as above, otherwise negative    OBJECTIVE:  Vital signs  Visit Vitals  BP (!) 152/78 (BP Location: Left arm, Patient Position: Sitting)   Pulse 82   Temp 97.2 °F (36.2 °C) (Temporal)   Ht 6' 2.02" (1.88 m)   Wt 122 kg (269 lb)   SpO2 95%   BMI 34.52 kg/m²          PHYSICAL EXAM:  General:  Awake, alert, no acute distress   Eyes:  Pupils equal, round, reactive to light.  Conjunctiva normal bilaterally.  Neck:  No lymphadenopathy, no " bruit  Cardiovascular: Regular rate and rhythm.  No murmurs.  Respiratory: Clear to auscultation bilaterally, normal effort  Extremities:  No peripheral edema, no cyanosis  Skin: No rashes    Chemistry:  Lab Results   Component Value Date     01/15/2025    K 4.3 01/15/2025    BUN 29 (H) 01/15/2025    CREATININE 1.67 (H) 01/15/2025    EGFRNORACEVR 46 01/15/2025    GLUCOSE 155 (H) 01/15/2025    CALCIUM 9.5 01/15/2025    ALKPHOS 59 01/15/2025    AST 20 01/15/2025    ALT 16 01/15/2025    TSH 2.45 09/29/2021    PSA 0.67 09/25/2024        Lab Results   Component Value Date    HGBA1C 8.6 (H) 01/15/2025        Hematology:  Lab Results   Component Value Date    WBC 7.05 01/15/2025    HGB 14.5 01/15/2025    MCV 85.3 01/15/2025     (L) 01/15/2025       Lipid Panel:  Lab Results   Component Value Date    CHOL 122 09/25/2024    HDL 38 (L) 09/25/2024    LDLDIRECT 50.2 09/25/2024    TRIG 170 09/25/2024        ASSESSMENT/PLAN:  1. Type 2 diabetes mellitus with diabetic peripheral angiopathy without gangrene, without long-term current use of insulin  Overview:  Lab Results   Component Value Date    HGBA1C 8.6 (H) 01/15/2025     Diagnosed 1990s    GI side effects with Mounjaro    Assessment & Plan:  He has had a significant rise in his hemoglobin A1c from 6.5 to 8.4 since his last visit    Continue Xigduo XR 5/1000 mg 2 tablets daily, pioglitazone 30 mg daily     Add Rybelsus 7 mg daily    Glucometer for daily blood glucose checks q.a.c. and q.h.s. fasting as needed to try and improve his glucose control as he has had difficulty maintaining tight glycemic control.  We will order a freestyle Ilda system for continuous glucometer readings.          Orders:  -     Hemoglobin A1C; Future; Expected date: 04/30/2025  -     pioglitazone (ACTOS) 30 MG tablet; Take 1 tablet (30 mg total) by mouth once daily.  Dispense: 90 tablet; Refill: 3  -     dapaglifloz propaned-metformin (XIGDUO XR) 5-1,000 mg; Take 2 tablets by mouth  every morning.  Dispense: 180 tablet; Refill: 3  -     semaglutide (RYBELSUS) 7 mg tablet; Take 1 tablet (7 mg total) by mouth once daily.  Dispense: 90 tablet; Refill: 3  -     blood-glucose sensor (FREESTYLE AMANDA 3 SENSOR) Ruby; 1 Device by Misc.(Non-Drug; Combo Route) route 4 (four) times daily. Dispense sensors for 3 months with 1 year of refills  Dispense: 1 each; Refill: 11    2. Thrombocytopenia  Overview:  Lab Results   Component Value Date    WBC 7.05 01/15/2025    HGB 14.5 01/15/2025    HCT 45.8 01/15/2025    MCV 85.3 01/15/2025     (L) 01/15/2025             Assessment & Plan:  Continue surveillance especially with the addition of aspirin therapy    Orders:  -     CBC Auto Differential; Future; Expected date: 04/30/2025    3. Diabetic nephropathy associated with type 2 diabetes mellitus  Overview:  Kidney Failure Risk Equation (Tangri)    Kidney Failure Risk at 2 years: 1%    Kidney Failure Risk at 5 years: 3%    Lab Results   Component Value Date    MICALBCREAT 133.0 (H) 09/25/2024    CREATININE 1.67 (H) 01/15/2025         Assessment & Plan:  On valsartan, Farxiga    Avoid NSAIDs      4. Stage 3a chronic kidney disease  Overview:  Kidney Failure Risk Equation (Tangri)    Kidney Failure Risk at 2 years: 1%    Kidney Failure Risk at 5 years: 3%    Lab Results   Component Value Date    MICALBCREAT 133.0 (H) 09/25/2024    CREATININE 1.67 (H) 01/15/2025           Assessment & Plan:  Avoid NSAIDs     Blood pressure control, on valsartan and Farxiga      5. Benign essential hypertension  Assessment & Plan:  Amlodipine 10 mg daily, valsartan 320 mg    Increase Chlorthalidone to 100 mg daily        Orders:  -     Comprehensive Metabolic Panel; Future; Expected date: 04/30/2025  -     chlorthalidone (HYGROTEN) 50 MG Tab; Take 2 tablets (100 mg total) by mouth once daily.  Dispense: 180 tablet; Refill: 3  -     amLODIPine (NORVASC) 10 MG tablet; Take 1 tablet (10 mg total) by mouth once daily.  Dispense: 90  tablet; Refill: 3  -     valsartan (DIOVAN) 320 MG tablet; Take 1 tablet (320 mg total) by mouth once daily.  Dispense: 90 tablet; Refill: 3    6. Mixed hyperlipidemia  Overview:  Lab Results   Component Value Date    CHOL 122 09/25/2024    HDL 38 (L) 09/25/2024    LDLDIRECT 50.2 09/25/2024    TRIG 170 09/25/2024            Assessment & Plan:  Goal LDL <70     Continue rosuvastatin 10 mg daily    Orders:  -     Lipid Panel; Future; Expected date: 04/30/2025  -     rosuvastatin (CRESTOR) 10 MG tablet; Take 1 tablet (10 mg total) by mouth once daily.  Dispense: 90 tablet; Refill: 3    7. Peripheral arterial disease  Overview:  December 2023:  Scattered plaquing throughout the lower extremities without significant focal stenosis, normal ABIs on ultrasound    Assessment & Plan:  Aspirin, statin with goal LDL less than 70    Orders:  -     aspirin 81 MG Chew; Take 1 tablet (81 mg total) by mouth once daily.    8. Cold intolerance  -     TSH; Future; Expected date: 04/30/2025        Follow Up:  Follow up in about 3 months (around 4/30/2025) for chronic health conditions, Fasting labs.      This note was generated with the assistance of ambient listening technology. Verbal consent was obtained by the patient and accompanying visitor(s) for the recording of patient appointment to facilitate this note. I attest to having reviewed and edited the generated note for accuracy, though some syntax or spelling errors may persist. Please contact the author of this note for any clarification.

## 2025-02-19 ENCOUNTER — TELEPHONE (OUTPATIENT)
Dept: FAMILY MEDICINE | Facility: CLINIC | Age: 65
End: 2025-02-19
Payer: MEDICARE

## 2025-02-19 DIAGNOSIS — E11.51 TYPE 2 DIABETES MELLITUS WITH DIABETIC PERIPHERAL ANGIOPATHY WITHOUT GANGRENE, WITHOUT LONG-TERM CURRENT USE OF INSULIN: ICD-10-CM

## 2025-02-19 DIAGNOSIS — I10 BENIGN ESSENTIAL HYPERTENSION: ICD-10-CM

## 2025-04-25 PROCEDURE — 83036 HEMOGLOBIN GLYCOSYLATED A1C: CPT | Performed by: FAMILY MEDICINE

## 2025-04-25 PROCEDURE — 85025 COMPLETE CBC W/AUTO DIFF WBC: CPT | Performed by: FAMILY MEDICINE

## 2025-04-25 PROCEDURE — 80053 COMPREHEN METABOLIC PANEL: CPT | Performed by: FAMILY MEDICINE

## 2025-04-25 PROCEDURE — 80061 LIPID PANEL: CPT | Performed by: FAMILY MEDICINE

## 2025-04-25 PROCEDURE — 84443 ASSAY THYROID STIM HORMONE: CPT | Performed by: FAMILY MEDICINE

## 2025-04-30 ENCOUNTER — OFFICE VISIT (OUTPATIENT)
Dept: FAMILY MEDICINE | Facility: CLINIC | Age: 65
End: 2025-04-30
Payer: MEDICARE

## 2025-04-30 VITALS
TEMPERATURE: 98 F | HEIGHT: 74 IN | SYSTOLIC BLOOD PRESSURE: 150 MMHG | OXYGEN SATURATION: 97 % | HEART RATE: 81 BPM | BODY MASS INDEX: 34.93 KG/M2 | WEIGHT: 272.19 LBS | DIASTOLIC BLOOD PRESSURE: 70 MMHG

## 2025-04-30 DIAGNOSIS — I10 BENIGN ESSENTIAL HYPERTENSION: ICD-10-CM

## 2025-04-30 DIAGNOSIS — I73.9 PERIPHERAL ARTERIAL DISEASE: ICD-10-CM

## 2025-04-30 DIAGNOSIS — E11.21 DIABETIC NEPHROPATHY ASSOCIATED WITH TYPE 2 DIABETES MELLITUS: ICD-10-CM

## 2025-04-30 DIAGNOSIS — D69.6 THROMBOCYTOPENIA: ICD-10-CM

## 2025-04-30 DIAGNOSIS — E78.2 MIXED HYPERLIPIDEMIA: Chronic | ICD-10-CM

## 2025-04-30 DIAGNOSIS — E11.51 TYPE 2 DIABETES MELLITUS WITH DIABETIC PERIPHERAL ANGIOPATHY WITHOUT GANGRENE, WITHOUT LONG-TERM CURRENT USE OF INSULIN: Primary | ICD-10-CM

## 2025-04-30 DIAGNOSIS — N18.31 STAGE 3A CHRONIC KIDNEY DISEASE: ICD-10-CM

## 2025-04-30 PROCEDURE — 1159F MED LIST DOCD IN RCRD: CPT | Mod: ,,, | Performed by: FAMILY MEDICINE

## 2025-04-30 PROCEDURE — 3008F BODY MASS INDEX DOCD: CPT | Mod: ,,, | Performed by: FAMILY MEDICINE

## 2025-04-30 PROCEDURE — 1160F RVW MEDS BY RX/DR IN RCRD: CPT | Mod: ,,, | Performed by: FAMILY MEDICINE

## 2025-04-30 PROCEDURE — 4010F ACE/ARB THERAPY RXD/TAKEN: CPT | Mod: ,,, | Performed by: FAMILY MEDICINE

## 2025-04-30 PROCEDURE — 99214 OFFICE O/P EST MOD 30 MIN: CPT | Mod: ,,, | Performed by: FAMILY MEDICINE

## 2025-04-30 PROCEDURE — 3078F DIAST BP <80 MM HG: CPT | Mod: ,,, | Performed by: FAMILY MEDICINE

## 2025-04-30 PROCEDURE — 3051F HG A1C>EQUAL 7.0%<8.0%: CPT | Mod: ,,, | Performed by: FAMILY MEDICINE

## 2025-04-30 PROCEDURE — 3077F SYST BP >= 140 MM HG: CPT | Mod: ,,, | Performed by: FAMILY MEDICINE

## 2025-04-30 RX ORDER — HYDRALAZINE HYDROCHLORIDE 25 MG/1
25 TABLET, FILM COATED ORAL 3 TIMES DAILY
Qty: 270 TABLET | Refills: 3 | Status: SHIPPED | OUTPATIENT
Start: 2025-04-30 | End: 2026-04-30

## 2025-04-30 RX ORDER — GLIPIZIDE 5 MG/1
5 TABLET, FILM COATED, EXTENDED RELEASE ORAL
Qty: 90 TABLET | Refills: 3 | Status: SHIPPED | OUTPATIENT
Start: 2025-04-30 | End: 2025-04-30 | Stop reason: CLARIF

## 2025-04-30 NOTE — ASSESSMENT & PLAN NOTE
Amlodipine 10 mg daily, valsartan 320 mg, Chlorthalidone to 100 mg daily    Add hydralazine 25 mg t.i.d.

## 2025-04-30 NOTE — ASSESSMENT & PLAN NOTE
Hemoglobin A1c improved from 8.6-7.8    Continue Xigduo XR 5/1000 mg 2 tablets daily, pioglitazone 30 mg daily    Add Januvia 50 mg daily     Again, recommended his use of glucometer Glucometer for daily blood glucose checks q.a.c. and q.h.s. fasting as needed to try and improve his glucose control as he has had difficulty maintaining tight glycemic control.

## 2025-04-30 NOTE — PROGRESS NOTES
"SUBJECTIVE:  HPI    Kane Gutierrez is a 64 y.o. male here for Results (lab).   History of Present Illness    CHIEF COMPLAINT:  Patient presents today for follow up.    DIABETES MANAGEMENT:  He discontinued Rybelsus after two weeks due to significant GI side effects, including stomach upset and nausea which worsened at night. Although he obtained a glucometer from the pharmacy, he has not been testing his blood sugar due to device incompatibility with his phone.    DIET:  He reports decreased appetite and altered taste preferences. He has reduced intake of sweets, rice, and no longer has a taste for yams.            Wests allergies, medications, history, and problem list were updated as appropriate.    ROS:  Pertinent ROS as above, otherwise negative    OBJECTIVE:  Vital signs  Visit Vitals  BP (!) 150/70 (BP Location: Right arm, Patient Position: Sitting)   Pulse 81   Temp 97.5 °F (36.4 °C) (Temporal)   Ht 6' 2.02" (1.88 m)   Wt 123.5 kg (272 lb 3.2 oz)   SpO2 97%   BMI 34.93 kg/m²          PHYSICAL EXAM:  General:  Awake, alert, no acute distress   Eyes:  Pupils equal, round, reactive to light.  Conjunctiva normal bilaterally.  Neck:  No lymphadenopathy, no bruit  Cardiovascular: Regular rate and rhythm.  No murmurs.  Respiratory: Clear to auscultation bilaterally, normal effort  Extremities:  No peripheral edema, no cyanosis  Skin: No rashes    Chemistry:  Lab Results   Component Value Date     04/25/2025    K 4.3 04/25/2025    BUN 26 (H) 04/25/2025    CREATININE 1.47 (H) 04/25/2025    EGFRNORACEVR 53 04/25/2025    CALCIUM 9.4 04/25/2025    ALKPHOS 58 04/25/2025    AST 24 04/25/2025    ALT 30 04/25/2025    TSH 3.060 04/25/2025    PSA 0.67 09/25/2024        Lab Results   Component Value Date    HGBA1C 7.8 (H) 04/25/2025        Hematology:  Lab Results   Component Value Date    WBC 6.95 04/25/2025    HGB 14.3 04/25/2025    MCV 85.9 04/25/2025     (L) 04/25/2025       Lipid Panel:  Lab Results "   Component Value Date    CHOL 148 04/25/2025    HDL 40 04/25/2025    LDLDIRECT 58.1 04/25/2025    TRIG 240 (H) 04/25/2025        ASSESSMENT/PLAN:  1. Type 2 diabetes mellitus with diabetic peripheral angiopathy without gangrene, without long-term current use of insulin  Overview:  Lab Results   Component Value Date    HGBA1C 7.8 (H) 04/25/2025     Diagnosed 1990s    GI side effects with Mounjaro and Rybelsus    Assessment & Plan:  Hemoglobin A1c improved from 8.6-7.8    Continue Xigduo XR 5/1000 mg 2 tablets daily, pioglitazone 30 mg daily    Add Januvia 50 mg daily     Again, recommended his use of glucometer Glucometer for daily blood glucose checks q.a.c. and q.h.s. fasting as needed to try and improve his glucose control as he has had difficulty maintaining tight glycemic control.           Orders:  -     Comprehensive Metabolic Panel; Future; Expected date: 07/30/2025  -     Hemoglobin A1C; Future; Expected date: 07/30/2025  -     Discontinue: glipiZIDE 5 MG TR24; Take 1 tablet (5 mg total) by mouth daily with breakfast.  Dispense: 90 tablet; Refill: 3  -     SITagliptin phosphate (JANUVIA) 50 MG Tab; Take 1 tablet (50 mg total) by mouth once daily.  Dispense: 90 tablet; Refill: 3    2. Diabetic nephropathy associated with type 2 diabetes mellitus  Overview:  Kidney Failure Risk Equation (Tangri)    Kidney Failure Risk at 2 years: 0.5%    Kidney Failure Risk at 5 years: 1.4%    Lab Results   Component Value Date    MICALBCREAT 133.0 (H) 09/25/2024    CREATININE 1.47 (H) 04/25/2025         Assessment & Plan:  On valsartan, Farxiga    Avoid NSAIDs      3. Stage 3a chronic kidney disease  Overview:  Kidney Failure Risk Equation (Tangri)    Kidney Failure Risk at 2 years: 1%    Kidney Failure Risk at 5 years: 3%    Lab Results   Component Value Date    MICALBCREAT 133.0 (H) 09/25/2024    CREATININE 1.67 (H) 01/15/2025           Assessment & Plan:  Avoid NSAIDs     Blood pressure control, on valsartan and  Farxiga      4. Thrombocytopenia  Overview:  Lab Results   Component Value Date    WBC 6.95 04/25/2025    HGB 14.3 04/25/2025    HCT 44.5 04/25/2025    MCV 85.9 04/25/2025     (L) 04/25/2025             Assessment & Plan:  Continue surveillance especially with the addition of aspirin therapy    Orders:  -     CBC Auto Differential; Future; Expected date: 07/30/2025    5. Benign essential hypertension  Assessment & Plan:  Amlodipine 10 mg daily, valsartan 320 mg, Chlorthalidone to 100 mg daily    Add hydralazine 25 mg t.i.d.        Orders:  -     hydrALAZINE (APRESOLINE) 25 MG tablet; Take 1 tablet (25 mg total) by mouth 3 (three) times daily.  Dispense: 270 tablet; Refill: 3    6. Mixed hyperlipidemia  Overview:  Lab Results   Component Value Date    CHOL 148 04/25/2025    HDL 40 04/25/2025    LDLDIRECT 58.1 04/25/2025    TRIG 240 (H) 04/25/2025            Assessment & Plan:  Goal LDL <70     Continue rosuvastatin 10 mg daily    Orders:  -     Lipid Panel; Future; Expected date: 07/30/2025    7. Peripheral arterial disease  Overview:  December 2023:  Scattered plaquing throughout the lower extremities without significant focal stenosis, normal ABIs on ultrasound    Assessment & Plan:  Aspirin, statin with goal LDL less than 70        Follow Up:  Follow up in about 3 months (around 7/30/2025) for chronic health conditions, Fasting labs.      This note was generated with the assistance of ambient listening technology. Verbal consent was obtained by the patient and accompanying visitor(s) for the recording of patient appointment to facilitate this note. I attest to having reviewed and edited the generated note for accuracy, though some syntax or spelling errors may persist. Please contact the author of this note for any clarification.

## 2025-08-05 PROCEDURE — 83036 HEMOGLOBIN GLYCOSYLATED A1C: CPT | Performed by: FAMILY MEDICINE

## 2025-08-05 PROCEDURE — 80061 LIPID PANEL: CPT | Performed by: FAMILY MEDICINE

## 2025-08-05 PROCEDURE — 85025 COMPLETE CBC W/AUTO DIFF WBC: CPT | Performed by: FAMILY MEDICINE

## 2025-08-05 PROCEDURE — 80053 COMPREHEN METABOLIC PANEL: CPT | Performed by: FAMILY MEDICINE

## 2025-08-14 ENCOUNTER — OFFICE VISIT (OUTPATIENT)
Dept: FAMILY MEDICINE | Facility: CLINIC | Age: 65
End: 2025-08-14
Payer: MEDICARE

## 2025-08-14 VITALS
WEIGHT: 277.38 LBS | TEMPERATURE: 98 F | OXYGEN SATURATION: 6 % | SYSTOLIC BLOOD PRESSURE: 120 MMHG | DIASTOLIC BLOOD PRESSURE: 64 MMHG | BODY MASS INDEX: 35.6 KG/M2 | HEART RATE: 70 BPM | HEIGHT: 74 IN

## 2025-08-14 DIAGNOSIS — N52.1 ERECTILE DYSFUNCTION DUE TO DISEASES CLASSIFIED ELSEWHERE: ICD-10-CM

## 2025-08-14 DIAGNOSIS — E11.21 DIABETIC NEPHROPATHY ASSOCIATED WITH TYPE 2 DIABETES MELLITUS: ICD-10-CM

## 2025-08-14 DIAGNOSIS — N40.0 BPH WITHOUT URINARY OBSTRUCTION: ICD-10-CM

## 2025-08-14 DIAGNOSIS — E78.2 MIXED HYPERLIPIDEMIA: Chronic | ICD-10-CM

## 2025-08-14 DIAGNOSIS — R01.1 CARDIAC MURMUR: ICD-10-CM

## 2025-08-14 DIAGNOSIS — N18.31 STAGE 3A CHRONIC KIDNEY DISEASE: ICD-10-CM

## 2025-08-14 DIAGNOSIS — E11.51 TYPE 2 DIABETES MELLITUS WITH DIABETIC PERIPHERAL ANGIOPATHY WITHOUT GANGRENE, WITHOUT LONG-TERM CURRENT USE OF INSULIN: Primary | ICD-10-CM

## 2025-08-14 DIAGNOSIS — I10 BENIGN ESSENTIAL HYPERTENSION: ICD-10-CM

## 2025-08-14 DIAGNOSIS — I73.9 PERIPHERAL ARTERIAL DISEASE: ICD-10-CM

## 2025-08-14 DIAGNOSIS — D69.6 THROMBOCYTOPENIA: ICD-10-CM

## 2025-08-14 PROCEDURE — 3078F DIAST BP <80 MM HG: CPT | Mod: ,,, | Performed by: FAMILY MEDICINE

## 2025-08-14 PROCEDURE — 1159F MED LIST DOCD IN RCRD: CPT | Mod: ,,, | Performed by: FAMILY MEDICINE

## 2025-08-14 PROCEDURE — 4010F ACE/ARB THERAPY RXD/TAKEN: CPT | Mod: ,,, | Performed by: FAMILY MEDICINE

## 2025-08-14 PROCEDURE — 1160F RVW MEDS BY RX/DR IN RCRD: CPT | Mod: ,,, | Performed by: FAMILY MEDICINE

## 2025-08-14 PROCEDURE — 3074F SYST BP LT 130 MM HG: CPT | Mod: ,,, | Performed by: FAMILY MEDICINE

## 2025-08-14 PROCEDURE — 3008F BODY MASS INDEX DOCD: CPT | Mod: ,,, | Performed by: FAMILY MEDICINE

## 2025-08-14 PROCEDURE — 99214 OFFICE O/P EST MOD 30 MIN: CPT | Mod: ,,, | Performed by: FAMILY MEDICINE

## 2025-08-14 PROCEDURE — 3044F HG A1C LEVEL LT 7.0%: CPT | Mod: ,,, | Performed by: FAMILY MEDICINE

## 2025-08-14 RX ORDER — DORZOLAMIDE HCL 20 MG/ML
1 SOLUTION/ DROPS OPHTHALMIC 2 TIMES DAILY
COMMUNITY
Start: 2025-07-21

## 2025-08-14 RX ORDER — LATANOPROST 50 UG/ML
1 SOLUTION/ DROPS OPHTHALMIC NIGHTLY
COMMUNITY
Start: 2025-07-21

## 2025-08-14 RX ORDER — GLIPIZIDE 5 MG/1
5 TABLET, FILM COATED, EXTENDED RELEASE ORAL EVERY MORNING
COMMUNITY
Start: 2025-04-30

## 2025-08-21 ENCOUNTER — HOSPITAL ENCOUNTER (OUTPATIENT)
Dept: CARDIOLOGY | Facility: HOSPITAL | Age: 65
Discharge: HOME | End: 2025-08-21
Attending: FAMILY MEDICINE
Payer: MEDICARE

## 2025-08-21 DIAGNOSIS — R01.1 CARDIAC MURMUR: ICD-10-CM

## 2025-08-21 LAB
AORTIC VALVE CUSP SEPERATION: 1.41 CM
ASCENDING AORTA: 2.8 CM
AV INDEX (PROSTH): 0.54
AV MEAN GRADIENT: 14 MMHG
AV PEAK GRADIENT: 25 MMHG
AV VALVE AREA BY VELOCITY RATIO: 1.4 CM²
AV VALVE AREA: 1.9 CM²
AV VELOCITY RATIO: 0.4
CV ECHO LV RWT: 0.34 CM
DOP CALC AO PEAK VEL: 2.5 M/S
DOP CALC AO VTI: 53 CM
DOP CALC LVOT AREA: 3.5 CM2
DOP CALC LVOT DIAMETER: 2.1 CM
DOP CALC LVOT PEAK VEL: 1 M/S
DOP CALC MV VTI: 32.9 CM
DOP CALCLVOT PEAK VEL VTI: 28.8 CM
E WAVE DECELERATION TIME: 227 MSEC
ECHO LV POSTERIOR WALL: 0.9 CM (ref 0.6–1.1)
FRACTIONAL SHORTENING: 37.7 % (ref 28–44)
INFERIOR VENA CAVA SIZE SNIFF: 0.2 CM
INTERVENTRICULAR SEPTUM: 1 CM (ref 0.6–1.1)
IVC DIAMETER: 1.3 CM
LEFT ATRIUM SIZE: 4 CM
LEFT ATRIUM VOLUME MOD: 99 ML
LEFT INTERNAL DIMENSION IN SYSTOLE: 3.3 CM (ref 2.1–4)
LEFT VENTRICLE DIASTOLIC VOLUME: 132 ML
LEFT VENTRICLE END SYSTOLIC VOLUME APICAL 2 CHAMBER: 98.9 ML
LEFT VENTRICLE END SYSTOLIC VOLUME APICAL 4 CHAMBER: 86.8 ML
LEFT VENTRICLE SYSTOLIC VOLUME: 43 ML
LEFT VENTRICULAR INTERNAL DIMENSION IN DIASTOLE: 5.3 CM (ref 3.5–6)
LEFT VENTRICULAR MASS: 187.3 G
LVED V (TEICH): 132.4 ML
LVES V (TEICH): 42.5 ML
LVOT MG: 2.1 MMHG
LVOT MV: 0.66 CM/S
MV MEAN GRADIENT: 2 MMHG
MV PEAK A VEL: 1.04 M/S
MV PEAK GRADIENT: 7 MMHG
MV STENOSIS PRESSURE HALF TIME: 73 MS
MV VALVE AREA BY CONTINUITY EQUATION: 3.03 CM2
MV VALVE AREA P 1/2 METHOD: 3.01 CM2
OHS CV RV/LV RATIO: 0.72 CM
PISA TR MAX VEL: 1.1 M/S
RA MAJOR: 5.6 CM
RA PRESSURE ESTIMATED: 3 MMHG
RIGHT ATRIAL AREA: 18 CM2
RIGHT ATRIUM VOLUME AREA LENGTH APICAL 4 CHAMBER: 49 ML
RIGHT VENTRICLE DIASTOLIC BASEL DIMENSION: 3.8 CM
RV TB RVSP: 4 MMHG
TDI LATERAL: 0.09 M/S
TDI SEPTAL: 0.13 M/S
TDI: 0.11 M/S
TR MAX PG: 5 MMHG
TRICUSPID ANNULAR PLANE SYSTOLIC EXCURSION: 2.1 CM
TV REST PULMONARY ARTERY PRESSURE: 8 MMHG

## 2025-08-21 PROCEDURE — 93306 TTE W/DOPPLER COMPLETE: CPT

## 2025-08-22 ENCOUNTER — TELEPHONE (OUTPATIENT)
Dept: FAMILY MEDICINE | Facility: CLINIC | Age: 65
End: 2025-08-22
Payer: MEDICARE

## 2025-08-22 PROBLEM — I35.0 NONRHEUMATIC AORTIC VALVE STENOSIS: Status: ACTIVE | Noted: 2025-08-14
